# Patient Record
Sex: MALE | Race: WHITE | Employment: OTHER | URBAN - NONMETROPOLITAN AREA
[De-identification: names, ages, dates, MRNs, and addresses within clinical notes are randomized per-mention and may not be internally consistent; named-entity substitution may affect disease eponyms.]

---

## 2017-07-05 LAB
ANION GAP SERPL CALCULATED.3IONS-SCNC: 7 MMOL/L (ref 4–12)
BUN BLDV-MCNC: 27 MG/DL (ref 7–20)
CALCIUM SERPL-MCNC: 11.4 MG/DL (ref 8.8–10.5)
CHLORIDE BLD-SCNC: 104 MEQ/L (ref 101–111)
CO2: 30 MEQ/L (ref 21–32)
CREAT SERPL-MCNC: 1.42 MG/DL (ref 0.7–1.3)
CREATININE CLEARANCE: 49
CREATININE, RANDOM URINE: 73.8 MG/DL
GLUCOSE: 111 MG/DL (ref 70–110)
HCT VFR BLD CALC: 41 % (ref 40–49)
HEMOGLOBIN: 13.2 GM/DL (ref 13.5–16.5)
PARATHYROID HORMONE INTACT: 16.2 U/ML (ref 12–88)
PHOSPHORUS: 3 MG/DL (ref 2.4–4.7)
POTASSIUM SERPL-SCNC: 5.3 MEQ/L (ref 3.6–5)
PROTEIN, URINE, RANDOM: 6 MG/DL
PROTEIN/CREAT RATIO: 0.1 G/1.73M2
SODIUM BLD-SCNC: 141 MEQ/L (ref 135–145)
VITAMIN D 25-HYDROXY: 49.46 NG/ML (ref 30–100)

## 2017-07-10 ENCOUNTER — TELEPHONE (OUTPATIENT)
Dept: NEPHROLOGY | Age: 77
End: 2017-07-10

## 2017-08-01 ENCOUNTER — HOSPITAL ENCOUNTER (OUTPATIENT)
Age: 77
Discharge: HOME | End: 2017-08-01
Payer: MEDICARE

## 2017-08-01 ENCOUNTER — OFFICE VISIT (OUTPATIENT)
Dept: NEPHROLOGY | Age: 77
End: 2017-08-01
Payer: MEDICARE

## 2017-08-01 ENCOUNTER — TELEPHONE (OUTPATIENT)
Dept: NEPHROLOGY | Age: 77
End: 2017-08-01

## 2017-08-01 VITALS
BODY MASS INDEX: 26.45 KG/M2 | RESPIRATION RATE: 16 BRPM | WEIGHT: 195 LBS | DIASTOLIC BLOOD PRESSURE: 60 MMHG | HEART RATE: 48 BPM | SYSTOLIC BLOOD PRESSURE: 130 MMHG

## 2017-08-01 DIAGNOSIS — N18.30 CKD (CHRONIC KIDNEY DISEASE) STAGE 3, GFR 30-59 ML/MIN (HCC): Primary | ICD-10-CM

## 2017-08-01 DIAGNOSIS — E87.5 HYPERKALEMIA: Primary | ICD-10-CM

## 2017-08-01 DIAGNOSIS — E55.9 VITAMIN D DEFICIENCY: ICD-10-CM

## 2017-08-01 DIAGNOSIS — E87.5 HYPERKALEMIA: ICD-10-CM

## 2017-08-01 DIAGNOSIS — I10 ESSENTIAL HYPERTENSION: ICD-10-CM

## 2017-08-01 LAB — POTASSIUM SERPL-SCNC: 5.7 MEQ/L (ref 3.5–5.2)

## 2017-08-01 PROCEDURE — 4040F PNEUMOC VAC/ADMIN/RCVD: CPT | Performed by: INTERNAL MEDICINE

## 2017-08-01 PROCEDURE — 3017F COLORECTAL CA SCREEN DOC REV: CPT | Performed by: INTERNAL MEDICINE

## 2017-08-01 PROCEDURE — G8419 CALC BMI OUT NRM PARAM NOF/U: HCPCS | Performed by: INTERNAL MEDICINE

## 2017-08-01 PROCEDURE — 1123F ACP DISCUSS/DSCN MKR DOCD: CPT | Performed by: INTERNAL MEDICINE

## 2017-08-01 PROCEDURE — 99214 OFFICE O/P EST MOD 30 MIN: CPT | Performed by: INTERNAL MEDICINE

## 2017-08-01 PROCEDURE — G8427 DOCREV CUR MEDS BY ELIG CLIN: HCPCS | Performed by: INTERNAL MEDICINE

## 2017-08-01 PROCEDURE — 1036F TOBACCO NON-USER: CPT | Performed by: INTERNAL MEDICINE

## 2017-08-01 PROCEDURE — 36415 COLL VENOUS BLD VENIPUNCTURE: CPT

## 2017-08-01 PROCEDURE — 84132 ASSAY OF SERUM POTASSIUM: CPT

## 2017-08-01 RX ORDER — SODIUM POLYSTYRENE SULFONATE 15 G/60ML
15 SUSPENSION ORAL; RECTAL ONCE
Qty: 60 ML | Refills: 0
Start: 2017-08-01 | End: 2019-07-30 | Stop reason: ALTCHOICE

## 2017-08-01 RX ORDER — M-VIT,TX,IRON,MINS/CALC/FOLIC 27MG-0.4MG
1 TABLET ORAL DAILY
COMMUNITY
End: 2019-07-30 | Stop reason: ALTCHOICE

## 2017-08-07 ENCOUNTER — TELEPHONE (OUTPATIENT)
Dept: NEPHROLOGY | Age: 77
End: 2017-08-07

## 2017-08-07 DIAGNOSIS — R39.15 URINARY URGENCY: Primary | ICD-10-CM

## 2017-08-07 LAB
DIGOXIN LEVEL: 0.7 NG/ML (ref 0.5–2)
POTASSIUM SERPL-SCNC: 3.7 MEQ/L (ref 3.6–5)

## 2017-08-08 LAB
APPEARANCE: ABNORMAL
BACTERIA: ABNORMAL
BILIRUBIN URINE: NEGATIVE
COLOR: YELLOW
GLUCOSE URINE: NEGATIVE
KETONES, URINE: NEGATIVE
LEUKOCYTES, UA: ABNORMAL
NITRITE, URINE: NEGATIVE
PH, URINE: 6 (ref 5–8)
PROTEIN, URINE: NEGATIVE
RBC URINE: ABNORMAL /HPF
SPECIFIC GRAVITY, URINE: 1.01 (ref 1–1.03)
SPERM: PRESENT
SQUAMOUS EPITHELIAL: ABNORMAL /HPF
URINALYSIS REFLEX: YES
URINE HGB: NEGATIVE
UROBILINOGEN, URINE: ABNORMAL (ref 0.2–1)
WBC CLUMPS: PRESENT /HPF
WBC URINE: >100 /HPF

## 2017-08-09 RX ORDER — CIPROFLOXACIN 250 MG/1
250 TABLET, FILM COATED ORAL 2 TIMES DAILY
Qty: 14 TABLET | Refills: 0 | Status: SHIPPED | OUTPATIENT
Start: 2017-08-09 | End: 2017-08-16

## 2017-08-10 LAB — URINE CULTURE REFLEX: NORMAL

## 2017-08-14 ENCOUNTER — TELEPHONE (OUTPATIENT)
Dept: NEPHROLOGY | Age: 77
End: 2017-08-14

## 2017-08-14 DIAGNOSIS — R52 PAIN: Primary | ICD-10-CM

## 2017-08-17 DIAGNOSIS — R52 PAIN: ICD-10-CM

## 2018-07-16 LAB
ANION GAP SERPL CALCULATED.3IONS-SCNC: 9 MMOL/L (ref 4–12)
BUN BLDV-MCNC: 20 MG/DL (ref 7–20)
CALCIUM SERPL-MCNC: 11.1 MG/DL (ref 8.8–10.5)
CHLORIDE BLD-SCNC: 101 MEQ/L (ref 101–111)
CO2: 29 MEQ/L (ref 21–32)
CREAT SERPL-MCNC: 1.33 MG/DL (ref 0.7–1.3)
CREATININE CLEARANCE: 52
GLUCOSE: 84 MG/DL (ref 70–110)
PARATHYROID HORMONE INTACT: 19.4 U/ML (ref 12–88)
PHOSPHORUS: 2.3 MG/DL (ref 2.4–4.7)
POTASSIUM SERPL-SCNC: 4.1 MEQ/L (ref 3.6–5)
SODIUM BLD-SCNC: 139 MEQ/L (ref 135–145)
VITAMIN D 25-HYDROXY: 57.49 NG/ML (ref 30–100)

## 2018-07-26 LAB
A/G RATIO: 0.8
ALBUMIN SERPL-MCNC: 3.4 G/DL
ALP BLD-CCNC: 42 U/L
ALT SERPL-CCNC: 23 U/L
AST SERPL-CCNC: 20 U/L
BASOPHILS ABSOLUTE: ABNORMAL /ΜL
BASOPHILS RELATIVE PERCENT: ABNORMAL %
BILIRUB SERPL-MCNC: 0.5 MG/DL (ref 0.1–1.4)
BILIRUBIN DIRECT: NORMAL MG/DL
BILIRUBIN, INDIRECT: NORMAL
BUN BLDV-MCNC: 22 MG/DL
CALCIUM SERPL-MCNC: 9.8 MG/DL
CHLORIDE BLD-SCNC: 101 MMOL/L
CHOLESTEROL, TOTAL: 105 MG/DL
CHOLESTEROL/HDL RATIO: NORMAL
CO2: 30 MMOL/L
CREAT SERPL-MCNC: 1.5 MG/DL
EOSINOPHILS ABSOLUTE: ABNORMAL /ΜL
EOSINOPHILS RELATIVE PERCENT: ABNORMAL %
GFR CALCULATED: 45.5
GLOBULIN: NORMAL
GLUCOSE BLD-MCNC: 92 MG/DL
HCT VFR BLD CALC: 40.6 % (ref 41–53)
HDLC SERPL-MCNC: 35 MG/DL (ref 35–70)
HEMOGLOBIN: 13 G/DL (ref 13.5–17.5)
LDL CHOLESTEROL CALCULATED: 50 MG/DL (ref 0–160)
LYMPHOCYTES ABSOLUTE: ABNORMAL /ΜL
LYMPHOCYTES RELATIVE PERCENT: ABNORMAL %
MCH RBC QN AUTO: ABNORMAL PG
MCHC RBC AUTO-ENTMCNC: ABNORMAL G/DL
MCV RBC AUTO: ABNORMAL FL
MONOCYTES ABSOLUTE: ABNORMAL /ΜL
MONOCYTES RELATIVE PERCENT: ABNORMAL %
NEUTROPHILS ABSOLUTE: ABNORMAL /ΜL
NEUTROPHILS RELATIVE PERCENT: ABNORMAL %
PLATELET # BLD: 212 K/ΜL
PMV BLD AUTO: ABNORMAL FL
POTASSIUM SERPL-SCNC: 4.6 MMOL/L
PROTEIN TOTAL: 7.6 G/DL
RBC # BLD: 4.5 10^6/ΜL
SODIUM BLD-SCNC: 138 MMOL/L
TRIGL SERPL-MCNC: 102 MG/DL
URIC ACID: 6.6
VLDLC SERPL CALC-MCNC: NORMAL MG/DL
WBC # BLD: 8.5 10^3/ML

## 2018-07-31 ENCOUNTER — OFFICE VISIT (OUTPATIENT)
Dept: NEPHROLOGY | Age: 78
End: 2018-07-31
Payer: MEDICARE

## 2018-07-31 VITALS — WEIGHT: 190 LBS | DIASTOLIC BLOOD PRESSURE: 80 MMHG | SYSTOLIC BLOOD PRESSURE: 128 MMHG | BODY MASS INDEX: 25.77 KG/M2

## 2018-07-31 DIAGNOSIS — N18.30 CKD (CHRONIC KIDNEY DISEASE) STAGE 3, GFR 30-59 ML/MIN (HCC): Primary | ICD-10-CM

## 2018-07-31 DIAGNOSIS — I10 ESSENTIAL HYPERTENSION: ICD-10-CM

## 2018-07-31 PROCEDURE — 1123F ACP DISCUSS/DSCN MKR DOCD: CPT | Performed by: INTERNAL MEDICINE

## 2018-07-31 PROCEDURE — 99213 OFFICE O/P EST LOW 20 MIN: CPT | Performed by: INTERNAL MEDICINE

## 2018-07-31 PROCEDURE — 1101F PT FALLS ASSESS-DOCD LE1/YR: CPT | Performed by: INTERNAL MEDICINE

## 2018-07-31 PROCEDURE — 4040F PNEUMOC VAC/ADMIN/RCVD: CPT | Performed by: INTERNAL MEDICINE

## 2018-07-31 PROCEDURE — G8427 DOCREV CUR MEDS BY ELIG CLIN: HCPCS | Performed by: INTERNAL MEDICINE

## 2018-07-31 PROCEDURE — 1036F TOBACCO NON-USER: CPT | Performed by: INTERNAL MEDICINE

## 2018-07-31 PROCEDURE — G8419 CALC BMI OUT NRM PARAM NOF/U: HCPCS | Performed by: INTERNAL MEDICINE

## 2018-07-31 NOTE — PROGRESS NOTES
the lungs as needed.  allopurinol (ZYLOPRIM) 300 MG tablet Take 100 mg by mouth daily.  atorvastatin (LIPITOR) 40 MG tablet Take 40 mg by mouth daily. 1/2      fenofibrate (TRICOR) 145 MG tablet Take 145 mg by mouth daily.  lisinopril (PRINIVIL;ZESTRIL) 2.5 MG tablet Take 2.5 mg by mouth daily.  sodium polystyrene (KAYEXALATE) 15 GM/60ML suspension Take 60 mLs by mouth once for 1 dose Daily x2 doses 60 mL 0     No current facility-administered medications for this visit.         Lab Results:    CBC:   Lab Results   Component Value Date    WBC 8.5 07/26/2018    HGB 13.0 (A) 07/26/2018    HCT 40.6 (A) 07/26/2018     07/26/2018     BMP:    Lab Results   Component Value Date     07/26/2018     07/16/2018     07/05/2017    K 4.6 07/26/2018    K 4.1 07/16/2018    K 3.7 08/07/2017     07/26/2018     07/16/2018     07/05/2017    CO2 30 07/26/2018    CO2 29 07/16/2018    CO2 30 07/05/2017    BUN 22 07/26/2018    BUN 20 07/16/2018    BUN 27 (H) 07/05/2017    CREATININE 1.5 07/26/2018    CREATININE 1.33 (H) 07/16/2018    CREATININE 1.42 (H) 07/05/2017    GLUCOSE 92 07/26/2018    GLUCOSE 84 07/16/2018    GLUCOSE 111 (H) 07/05/2017      Hepatic:   Lab Results   Component Value Date    AST 20 07/26/2018    ALT 23 07/26/2018    BILITOT 0.5 07/26/2018    ALKPHOS 42 07/26/2018     BNP: No results found for: BNP  Lipids:   Lab Results   Component Value Date    CHOL 105 07/26/2018    HDL 35 07/26/2018     INR: No results found for: INR  URINE:   Lab Results   Component Value Date    PROTUR Negative 08/08/2017     Lab Results   Component Value Date    NITRU Negative 08/08/2017    COLORU Yellow 08/08/2017    WBCUA >100 08/08/2017    RBCUA 21-50 08/08/2017    MUCUS Present 12/03/2014    YEAST Present 06/30/2015    BACTERIA Trace 08/08/2017    LEUKOCYTESUR Large 08/08/2017    UROBILINOGEN <2.0 E.U./dL 08/08/2017    BILIRUBINUR Negative 08/08/2017    GLUCOSEU Negative 08/08/2017    KETUA Negative 08/08/2017    AMORPHOUS Present 12/03/2014      Microalbumen/Creatinine ratio:  No components found for: RUCREAT    Objective:   Vitals: /80   Wt 190 lb (86.2 kg)   BMI 25.77 kg/m²      Constitutional:  Alert, awake, no apparent distress  Skin:normal  HEENT:Pupils are reactive . Throat is clear  Neck:supple with no thyromegally  Cardiovascular:  S1, S2 without murmur  Respiratory:  Clear with no wheezes or rales   Abdomen: +bs, soft, non tender  Ext: No LE edema  Musculoskeletal:Intact  Neuro:Alert and oriented with no deficit    Electronically signed by Sirena Michaels MD on 7/31/2018 at 10:53 AM

## 2018-08-02 ENCOUNTER — TELEPHONE (OUTPATIENT)
Dept: NEPHROLOGY | Age: 78
End: 2018-08-02

## 2019-01-23 ENCOUNTER — TELEPHONE (OUTPATIENT)
Dept: NEPHROLOGY | Age: 79
End: 2019-01-23

## 2019-01-24 ENCOUNTER — TELEPHONE (OUTPATIENT)
Dept: NEPHROLOGY | Age: 79
End: 2019-01-24

## 2019-06-26 LAB
ALBUMIN SERPL-MCNC: 3.3 G/DL
ALP BLD-CCNC: 48 U/L
ALT SERPL-CCNC: 23 U/L
ANION GAP SERPL CALCULATED.3IONS-SCNC: 3 MMOL/L
AST SERPL-CCNC: 24 U/L
BASOPHILS ABSOLUTE: ABNORMAL /ΜL
BASOPHILS RELATIVE PERCENT: ABNORMAL %
BILIRUB SERPL-MCNC: 0.6 MG/DL (ref 0.1–1.4)
BUN BLDV-MCNC: 18 MG/DL
CALCIUM SERPL-MCNC: 10.7 MG/DL
CHLORIDE BLD-SCNC: 104 MMOL/L
CO2: 32 MMOL/L
CREAT SERPL-MCNC: 1.5 MG/DL
EOSINOPHILS ABSOLUTE: ABNORMAL /ΜL
EOSINOPHILS RELATIVE PERCENT: ABNORMAL %
GFR CALCULATED: 45.4
GLUCOSE BLD-MCNC: 84 MG/DL
HCT VFR BLD CALC: 37.1 % (ref 41–53)
HEMOGLOBIN: 12.5 G/DL (ref 13.5–17.5)
IRON SATURATION: 28
IRON: 144
LYMPHOCYTES ABSOLUTE: ABNORMAL /ΜL
LYMPHOCYTES RELATIVE PERCENT: ABNORMAL %
MCH RBC QN AUTO: ABNORMAL PG
MCHC RBC AUTO-ENTMCNC: ABNORMAL G/DL
MCV RBC AUTO: ABNORMAL FL
MONOCYTES ABSOLUTE: ABNORMAL /ΜL
MONOCYTES RELATIVE PERCENT: ABNORMAL %
NEUTROPHILS ABSOLUTE: ABNORMAL /ΜL
NEUTROPHILS RELATIVE PERCENT: ABNORMAL %
PLATELET # BLD: 189 K/ΜL
PMV BLD AUTO: ABNORMAL FL
POTASSIUM SERPL-SCNC: 5.1 MMOL/L
RBC # BLD: 4.15 10^6/ΜL
SODIUM BLD-SCNC: 139 MMOL/L
TOTAL IRON BINDING CAPACITY: 509
TOTAL PROTEIN: 7.1
URIC ACID: 6.4
WBC # BLD: 6.5 10^3/ML

## 2019-07-22 LAB
ANION GAP SERPL CALCULATED.3IONS-SCNC: 5 MMOL/L (ref 4–12)
BUN BLDV-MCNC: 16 MG/DL (ref 7–20)
CALCIUM SERPL-MCNC: 10.7 MG/DL (ref 8.8–10.5)
CHLORIDE BLD-SCNC: 103 MEQ/L (ref 101–111)
CO2: 33 MEQ/L (ref 21–32)
CREAT SERPL-MCNC: 1.27 MG/DL (ref 0.6–1.3)
CREATININE CLEARANCE: 55
GLUCOSE: 95 MG/DL (ref 70–110)
PARATHYROID HORMONE INTACT: 24.5 U/ML (ref 12–88)
PHOSPHORUS: 2.3 MG/DL (ref 2.4–4.7)
POTASSIUM SERPL-SCNC: 5.3 MEQ/L (ref 3.6–5)
SODIUM BLD-SCNC: 141 MEQ/L (ref 135–145)
VITAMIN D 25-HYDROXY: 41.2 NG/ML (ref 30–100)

## 2019-07-23 ENCOUNTER — TELEPHONE (OUTPATIENT)
Dept: NEPHROLOGY | Age: 79
End: 2019-07-23

## 2019-07-23 DIAGNOSIS — E83.52 HYPERCALCEMIA: Primary | ICD-10-CM

## 2019-07-23 DIAGNOSIS — E87.5 HYPERKALEMIA: ICD-10-CM

## 2019-07-23 NOTE — TELEPHONE ENCOUNTER
Pt called back and states the pharmacy does not have any Veltassa. It is $150. Pt will stop by the office for samples.

## 2019-07-27 LAB
ANION GAP SERPL CALCULATED.3IONS-SCNC: 4 MMOL/L (ref 4–12)
BUN BLDV-MCNC: 18 MG/DL (ref 7–20)
CALCIUM SERPL-MCNC: 10.6 MG/DL (ref 8.8–10.5)
CHLORIDE BLD-SCNC: 106 MEQ/L (ref 101–111)
CO2: 33 MEQ/L (ref 21–32)
CREAT SERPL-MCNC: 1.67 MG/DL (ref 0.6–1.3)
CREATININE CLEARANCE: 40
GLUCOSE: 96 MG/DL (ref 70–110)
POTASSIUM SERPL-SCNC: 5.5 MEQ/L (ref 3.6–5)
SODIUM BLD-SCNC: 143 MEQ/L (ref 135–145)

## 2019-07-30 ENCOUNTER — OFFICE VISIT (OUTPATIENT)
Dept: NEPHROLOGY | Age: 79
End: 2019-07-30
Payer: MEDICARE

## 2019-07-30 ENCOUNTER — TELEPHONE (OUTPATIENT)
Dept: NEPHROLOGY | Age: 79
End: 2019-07-30

## 2019-07-30 VITALS
WEIGHT: 188.2 LBS | DIASTOLIC BLOOD PRESSURE: 75 MMHG | BODY MASS INDEX: 25.52 KG/M2 | HEART RATE: 60 BPM | SYSTOLIC BLOOD PRESSURE: 142 MMHG | OXYGEN SATURATION: 96 %

## 2019-07-30 DIAGNOSIS — N17.9 AKI (ACUTE KIDNEY INJURY) (HCC): Primary | ICD-10-CM

## 2019-07-30 DIAGNOSIS — E87.5 HYPERKALEMIA: ICD-10-CM

## 2019-07-30 DIAGNOSIS — E83.52 HYPERCALCEMIA: ICD-10-CM

## 2019-07-30 DIAGNOSIS — I10 ESSENTIAL HYPERTENSION: ICD-10-CM

## 2019-07-30 DIAGNOSIS — N18.30 CKD (CHRONIC KIDNEY DISEASE) STAGE 3, GFR 30-59 ML/MIN (HCC): ICD-10-CM

## 2019-07-30 PROCEDURE — 1123F ACP DISCUSS/DSCN MKR DOCD: CPT | Performed by: INTERNAL MEDICINE

## 2019-07-30 PROCEDURE — G8419 CALC BMI OUT NRM PARAM NOF/U: HCPCS | Performed by: INTERNAL MEDICINE

## 2019-07-30 PROCEDURE — G8427 DOCREV CUR MEDS BY ELIG CLIN: HCPCS | Performed by: INTERNAL MEDICINE

## 2019-07-30 PROCEDURE — 1036F TOBACCO NON-USER: CPT | Performed by: INTERNAL MEDICINE

## 2019-07-30 PROCEDURE — 4040F PNEUMOC VAC/ADMIN/RCVD: CPT | Performed by: INTERNAL MEDICINE

## 2019-07-30 PROCEDURE — 99213 OFFICE O/P EST LOW 20 MIN: CPT | Performed by: INTERNAL MEDICINE

## 2019-07-30 RX ORDER — OMEPRAZOLE 10 MG/1
20 CAPSULE, DELAYED RELEASE ORAL DAILY
COMMUNITY
End: 2021-11-11

## 2019-07-30 RX ORDER — DOCUSATE SODIUM 100 MG/1
100 CAPSULE, LIQUID FILLED ORAL PRN
COMMUNITY
End: 2021-11-11

## 2019-07-30 RX ORDER — BUDESONIDE 0.5 MG/2ML
1 INHALANT ORAL 2 TIMES DAILY
COMMUNITY

## 2019-08-06 LAB — POTASSIUM SERPL-SCNC: 4.3 MEQ/L (ref 3.6–5)

## 2019-08-07 ENCOUNTER — TELEPHONE (OUTPATIENT)
Dept: NEPHROLOGY | Age: 79
End: 2019-08-07

## 2019-08-07 NOTE — TELEPHONE ENCOUNTER
----- Message from Meaghan Walsh MD sent at 8/6/2019  4:42 PM EDT -----  Serum potassium is back to normal.

## 2020-07-13 ENCOUNTER — TELEPHONE (OUTPATIENT)
Dept: NEPHROLOGY | Age: 80
End: 2020-07-13

## 2020-07-13 NOTE — TELEPHONE ENCOUNTER
Tera Prado from Dr. Lo Romeo office called. She said that she needs hydration orders for CTA of chest please. When patient comes in to be seen 7/28/20.

## 2020-07-29 NOTE — TELEPHONE ENCOUNTER
Patient need to drink 500 mL fluid which is 16 ounces before the procedure and another 500 mL immediately after the procedure  BMP in 3 to 4 days after the procedure

## 2020-08-03 NOTE — TELEPHONE ENCOUNTER
I faxed this information to Dr. Sepulveda [facility] office.

## 2020-09-19 LAB
ANION GAP SERPL CALCULATED.3IONS-SCNC: 4 MMOL/L (ref 4–12)
BUN BLDV-MCNC: 22 MG/DL (ref 7–20)
CALCIUM SERPL-MCNC: 10.4 MG/DL (ref 8.8–10.5)
CHLORIDE BLD-SCNC: 102 MEQ/L (ref 101–111)
CO2: 32 MEQ/L (ref 21–32)
CREAT SERPL-MCNC: 1.25 MG/DL (ref 0.6–1.3)
CREATININE CLEARANCE: 56
GLUCOSE: 81 MG/DL (ref 70–110)
POTASSIUM SERPL-SCNC: 4.4 MEQ/L (ref 3.6–5)
SODIUM BLD-SCNC: 138 MEQ/L (ref 135–145)

## 2020-11-10 ENCOUNTER — HOSPITAL ENCOUNTER (OUTPATIENT)
Age: 80
Discharge: HOME | End: 2020-11-10
Payer: MEDICARE

## 2020-11-10 DIAGNOSIS — E55.9 VITAMIN D DEFICIENCY: ICD-10-CM

## 2020-11-10 DIAGNOSIS — N18.32 STAGE 3B CHRONIC KIDNEY DISEASE (HCC): ICD-10-CM

## 2020-11-10 LAB
ANION GAP SERPL CALCULATED.3IONS-SCNC: 11 MEQ/L (ref 8–16)
BUN BLDV-MCNC: 25 MG/DL (ref 7–22)
CALCIUM SERPL-MCNC: 10.7 MG/DL (ref 8.5–10.5)
CHLORIDE BLD-SCNC: 103 MEQ/L (ref 98–111)
CO2: 32 MEQ/L (ref 23–33)
CREAT SERPL-MCNC: 1.5 MG/DL (ref 0.4–1.2)
GFR SERPL CREATININE-BSD FRML MDRD: 45 ML/MIN/1.73M2
GLUCOSE BLD-MCNC: 99 MG/DL (ref 70–108)
POTASSIUM SERPL-SCNC: 4.7 MEQ/L (ref 3.5–5.2)
PTH INTACT: 37.6 PG/ML (ref 15–65)
SODIUM BLD-SCNC: 146 MEQ/L (ref 135–145)
VITAMIN D 25-HYDROXY: 48 NG/ML (ref 30–100)

## 2020-11-10 PROCEDURE — 36415 COLL VENOUS BLD VENIPUNCTURE: CPT

## 2020-11-10 PROCEDURE — 80048 BASIC METABOLIC PNL TOTAL CA: CPT

## 2020-11-10 PROCEDURE — 83970 ASSAY OF PARATHORMONE: CPT

## 2020-11-10 PROCEDURE — 82306 VITAMIN D 25 HYDROXY: CPT

## 2020-11-12 ENCOUNTER — VIRTUAL VISIT (OUTPATIENT)
Dept: NEPHROLOGY | Age: 80
End: 2020-11-12
Payer: MEDICARE

## 2020-11-12 PROCEDURE — 99213 OFFICE O/P EST LOW 20 MIN: CPT | Performed by: INTERNAL MEDICINE

## 2020-11-12 NOTE — PROGRESS NOTES
2020    TELEHEALTH EVALUATION -- Audio/Visual (During [id number]-[id number] public health emergency)  Telehealth video visit for Mr. De Kuo. Place of visit for the patient is home  Place of visit for physician is office  Reason for visit is follow-up for chronic kidney disease  Patient initiated the visit    HPI: This is a follow-up visit for Mr. De Kuo. I see him for chronic kidney disease. He was last seen in 2019. Doing well since then. No new medication. No complaint. No lower extremity edema. No chest pain. He has  chronic shortness of breath and wears oxygen around-the-clock. No fever or chills. No nausea or vomiting. [employer] (:  1940) has requested an audio/video evaluation for the following concern(s):    Follow-up for chronic kidney disease    Review of Systems: Review of system is essentially negative except for chronic shortness of breath requiring home oxygen. No chest pain. No difficulties with urination. No nausea vomiting. No fever or chills. No headaches. No abdominal pain. Prior to Visit Medications    Medication Sig Taking? Authorizing Provider   budesonide (PULMICORT) 0.5 MG/2ML nebulizer suspension Take 1 ampule by nebulization 2 times daily Yes Historical Provider, MD   Arformoterol Tartrate (BROVANA IN) Inhale into the lungs Yes Historical Provider, MD   omeprazole (PRILOSEC) 10 MG delayed release capsule Take 20 mg by mouth daily Yes Historical Provider, MD   tiotropium (SPIRIVA HANDIHALER) 18 MCG inhalation capsule Inhale 18 mcg into the lungs daily Yes Historical Provider, MD   docusate sodium (COLACE) 100 MG capsule Take 100 mg by mouth 2 times daily Yes Historical Provider, MD   MILK THISTLE PO Take 1,000 mg by mouth daily  Yes Historical Provider, MD   aspirin 81 MG tablet Take 81 mg by mouth daily Yes Historical Provider, MD   OXYGEN Inhale  into the lungs as needed.  Yes Historical Provider, MD   allopurinol (ZYLOPRIM) 300 MG tablet Take 100 mg by mouth daily. Yes Historical Provider, MD   atorvastatin (LIPITOR) 40 MG tablet Take 20 mg by mouth daily  Yes Historical Provider, MD   fenofibrate (TRICOR) 145 MG tablet Take 145 mg by mouth daily. Yes Historical Provider, MD   lisinopril (PRINIVIL;ZESTRIL) 2.5 MG tablet Take 2.5 mg by mouth daily. Yes Historical Provider, MD       Social History     Tobacco Use    Smoking status: Former Smoker     Packs/day: 2.00     Years: 40.00     Pack years: 80.00     Types: Cigarettes     Last attempt to quit: 1989     Years since quittin.6    Smokeless tobacco: Never Used   Substance Use Topics    Alcohol use: Not on file    Drug use: Not on file            PHYSICAL EXAMINATION:  [ INSTRUCTIONS:  \"[x]\" Indicates a positive item  \"[]\" Indicates a negative item  -- DELETE ALL ITEMS NOT EXAMINED]  Vital Signs: (As obtained by patient/caregiver or practitioner observation)    Blood pressure-128/80 heart rate-    Respiratory rate-    Temperature-  Pulse oximetry-     Constitutional: [x] Appears well-developed and well-nourished [x] No apparent distress      [] Abnormal-   Mental status  [x] Alert and awake  [x] Oriented to person/place/time [x]Able to follow commands      Eyes:  EOM    [x]  Normal  [] Abnormal-  Sclera  [x]  Normal  [] Abnormal -         Discharge [x]  None visible  [] Abnormal -    HENT:   [x] Normocephalic, atraumatic.   [] Abnormal   [x] Mouth/Throat: Mucous membranes are moist.     External Ears [x] Normal  [] Abnormal-     Neck: [x] No visualized mass     Pulmonary/Chest: [x] Respiratory effort normal.  [x] No visualized signs of difficulty breathing or respiratory distress        [] Abnormal-      Musculoskeletal:   [] Normal gait with no signs of ataxia         [] Normal range of motion of neck        [] Abnormal-       Neurological:        [x] No Facial Asymmetry (Cranial nerve 7 motor function) (limited exam to video visit)          [x] No gaze palsy        [] Abnormal- Skin:        [x] No significant exanthematous lesions or discoloration noted on facial skin         [] Abnormal-            Psychiatric:       [x] Normal Affect [x] No Hallucinations        [] Abnormal-     Other pertinent observable physical exam findings-     ASSESSMENT/PLAN:   Diagnosis Orders   1. Stage 3b chronic kidney disease  Basic Metabolic Panel   2. Vitamin D deficiency  Vitamin D 25 Hydroxy    PTH, Intact   3. Essential hypertension     PLAN:  Lab results reviewed with the patient. He understood. I addressed his questions. Serum creatinine is slightly improved to 1.5 mg/dL from 1.67 mg/dL in July 2019 the last time I saw him. However, it is increased from 1.25 mg/dl in September 2020. Medications reviewed  No changes  I encouraged him to drink more fluid  Return visit in 12 months with labs      Return in about 1 year (around 11/12/2021). Scott Rogers is a 79 y.o. male being evaluated by a Virtual Visit (video visit) encounter to address concerns as mentioned above. A caregiver was present when appropriate. Due to this being a TeleHealth encounter (During [address] emergency), evaluation of the following organ systems was limited: Vitals/Constitutional/EENT/Resp/CV/GI//MS/Neuro/Skin/Heme-Lymph-Imm. Pursuant to the emergency declaration under the [address] [place] [address], [address] [place] [address] authority and the [employer] Act, this Virtual Visit was conducted with patient's (and/or legal guardian's) consent, to reduce the patient's risk of exposure to COVID-19 and provide necessary medical care. The patient (and/or legal guardian) has also been advised to contact this office for worsening conditions or problems, and seek emergency medical treatment and/or call 911 if deemed necessary.      Patient identification was verified at the start of the visit: Yes    Total time spent on this encounter: About 15 minutes including  documentation time and chart review    Services were provided through a video synchronous discussion virtually to substitute for in-person clinic visit. Patient and provider were located at their individual homes. --Kathy Smith MD on 11/12/2020 at 4:56 PM    An electronic signature was used to authenticate this note.

## 2021-01-12 ENCOUNTER — HOSPITAL ENCOUNTER (OUTPATIENT)
Age: 81
Setting detail: SPECIMEN
Discharge: HOME | End: 2021-01-12
Payer: MEDICARE

## 2021-01-12 LAB
ANION GAP SERPL CALCULATED.3IONS-SCNC: 7 MEQ/L (ref 8–16)
BUN BLDV-MCNC: 25 MG/DL (ref 7–22)
CALCIUM SERPL-MCNC: 11.4 MG/DL (ref 8.5–10.5)
CHLORIDE BLD-SCNC: 100 MEQ/L (ref 98–111)
CO2: 29 MEQ/L (ref 23–33)
CREAT SERPL-MCNC: 1.1 MG/DL (ref 0.4–1.2)
GFR SERPL CREATININE-BSD FRML MDRD: 65 ML/MIN/1.73M2
GLUCOSE BLD-MCNC: 85 MG/DL (ref 70–108)
MAGNESIUM: 1.7 MG/DL (ref 1.6–2.4)
POTASSIUM SERPL-SCNC: 5.2 MEQ/L (ref 3.5–5.2)
SODIUM BLD-SCNC: 136 MEQ/L (ref 135–145)

## 2021-01-12 PROCEDURE — 36415 COLL VENOUS BLD VENIPUNCTURE: CPT

## 2021-01-12 PROCEDURE — 83735 ASSAY OF MAGNESIUM: CPT

## 2021-01-12 PROCEDURE — 80048 BASIC METABOLIC PNL TOTAL CA: CPT

## 2021-01-19 ENCOUNTER — TELEPHONE (OUTPATIENT)
Dept: NEPHROLOGY | Age: 81
End: 2021-01-19

## 2021-01-19 DIAGNOSIS — N18.32 STAGE 3B CHRONIC KIDNEY DISEASE (HCC): Primary | ICD-10-CM

## 2021-01-19 NOTE — TELEPHONE ENCOUNTER
Dr. Lavinia Rashid called in regards to pt's calcium level. He wants to know if pt is taking any calcium supplements or consuming a lot of foods rich in calcium. If so, he needs to stop and repeat BMP on 1/21/21.

## 2021-01-21 ENCOUNTER — TELEPHONE (OUTPATIENT)
Dept: NEPHROLOGY | Age: 81
End: 2021-01-21

## 2021-01-21 DIAGNOSIS — E83.52 HYPERCALCEMIA: Primary | ICD-10-CM

## 2021-01-21 LAB
ANION GAP SERPL CALCULATED.3IONS-SCNC: 4 MMOL/L (ref 4–12)
BUN BLDV-MCNC: 22 MG/DL (ref 7–20)
CALCIUM SERPL-MCNC: 10.9 MG/DL (ref 8.8–10.5)
CHLORIDE BLD-SCNC: 100 MEQ/L (ref 101–111)
CO2: 30 MEQ/L (ref 21–32)
CREAT SERPL-MCNC: 1.1 MG/DL (ref 0.6–1.3)
CREATININE CLEARANCE: >60
GLUCOSE: 86 MG/DL (ref 70–110)
POTASSIUM SERPL-SCNC: 4.3 MEQ/L (ref 3.6–5)
SODIUM BLD-SCNC: 134 MEQ/L (ref 135–145)

## 2021-01-21 NOTE — TELEPHONE ENCOUNTER
----- Message from Skippy Duverney, MD sent at 1/21/2021  4:31 PM EST -----  Serum calcium is improved but still a little high. Continue to drink more fluid. No calcium supplement. Cut down on milk or dairy products if taking any. BMP in 6 days.

## 2021-01-22 NOTE — TELEPHONE ENCOUNTER
I called pt and informed him of this. He will continue the fluid consumption and watch his calcium intake. He will go to [facility] next week to have blood work done.

## 2021-01-29 LAB
ANION GAP SERPL CALCULATED.3IONS-SCNC: 3 MMOL/L (ref 4–12)
BUN BLDV-MCNC: 18 MG/DL (ref 7–20)
CALCIUM SERPL-MCNC: 10.3 MG/DL (ref 8.8–10.5)
CHLORIDE BLD-SCNC: 102 MEQ/L (ref 101–111)
CO2: 32 MEQ/L (ref 21–32)
CREAT SERPL-MCNC: 1.12 MG/DL (ref 0.6–1.3)
CREATININE CLEARANCE: >60
GLUCOSE: 84 MG/DL (ref 70–110)
PARATHYROID HORMONE INTACT: 23.8 U/ML (ref 12–88)
POTASSIUM SERPL-SCNC: 5.6 MEQ/L (ref 3.6–5)
SODIUM BLD-SCNC: 137 MEQ/L (ref 135–145)
VITAMIN D 25-HYDROXY: 55.5 NG/ML (ref 30–100)

## 2021-02-01 ENCOUNTER — TELEPHONE (OUTPATIENT)
Dept: NEPHROLOGY | Age: 81
End: 2021-02-01

## 2021-02-01 DIAGNOSIS — E87.5 HYPERKALEMIA: Primary | ICD-10-CM

## 2021-02-01 RX ORDER — SODIUM POLYSTYRENE SULFONATE 15 G/60ML
SUSPENSION ORAL; RECTAL
Qty: 180 ML | Refills: 0 | Status: SHIPPED | OUTPATIENT
Start: 2021-02-01 | End: 2021-11-08

## 2021-02-01 NOTE — TELEPHONE ENCOUNTER
----- Message from Wes Jean MD sent at 2/1/2021  5:38 AM EST -----  Serum potassium is slightly high  Low potassium diet   Kayexalate 15 gm daily for three days   Repeat serum potassium 3 days

## 2021-02-04 ENCOUNTER — TELEPHONE (OUTPATIENT)
Dept: NEPHROLOGY | Age: 81
End: 2021-02-04

## 2021-02-04 LAB
ANION GAP SERPL CALCULATED.3IONS-SCNC: 3 MMOL/L (ref 4–12)
BUN BLDV-MCNC: 17 MG/DL (ref 7–20)
CALCIUM SERPL-MCNC: 9.4 MG/DL (ref 8.8–10.5)
CHLORIDE BLD-SCNC: 102 MEQ/L (ref 101–111)
CO2: 35 MEQ/L (ref 21–32)
CREAT SERPL-MCNC: 0.87 MG/DL (ref 0.6–1.3)
CREATININE CLEARANCE: >60
GLUCOSE: 103 MG/DL (ref 70–110)
POTASSIUM SERPL-SCNC: 3.4 MEQ/L (ref 3.6–5)
SODIUM BLD-SCNC: 140 MEQ/L (ref 135–145)

## 2021-02-04 NOTE — TELEPHONE ENCOUNTER
----- Message from aSnto Thakur MD sent at 2/4/2021  4:07 PM EST -----  Serum potassium is now very slightly low. High potassium diet only for now. May repeat the potassium level in 5 days.

## 2021-02-04 NOTE — TELEPHONE ENCOUNTER
----- Message from Heladio Malone MD sent at 2/4/2021  4:07 PM EST -----  Serum potassium is now very slightly low. High potassium diet only for now. May repeat the potassium level in 5 days.

## 2021-02-04 NOTE — TELEPHONE ENCOUNTER
Patient phoned - states he is having some feet and ankle swelling for 4 days now- has lasix 20 mg at home from a previous time he was taking - asking if he can take some to get swelling down?

## 2021-02-04 NOTE — TELEPHONE ENCOUNTER
Attempted to contact patient. Left ELENI Zuniga,  She verbalized understanding.  Lab order faxed to [facility] for Tuesday 2/05/21

## 2021-02-08 ENCOUNTER — TELEPHONE (OUTPATIENT)
Dept: NEPHROLOGY | Age: 81
End: 2021-02-08

## 2021-02-08 DIAGNOSIS — R23.2 HOT FLASH IN MALE: ICD-10-CM

## 2021-02-08 DIAGNOSIS — N18.2 CHRONIC KIDNEY DISEASE, STAGE II (MILD): Primary | ICD-10-CM

## 2021-02-08 RX ORDER — FUROSEMIDE 20 MG/1
20 TABLET ORAL DAILY
COMMUNITY

## 2021-02-08 NOTE — TELEPHONE ENCOUNTER
I called pt and informed him of this information. He understood. He will go to [facility] tomorrow to get blood work done. Pt also states that he has been having hot flashes which can last from 10 min to a couple of hours. He told his urologist this and his urology told him to contact you. What do you want to do?

## 2021-02-08 NOTE — TELEPHONE ENCOUNTER
Pt states he took the lasix 20 mg daily for 3 days. Pt did not notice any decrease in his swelling. What do you want him to do?

## 2021-02-09 LAB
ANION GAP SERPL CALCULATED.3IONS-SCNC: 2 MMOL/L (ref 4–12)
BUN BLDV-MCNC: 17 MG/DL (ref 7–20)
CALCIUM SERPL-MCNC: 10.2 MG/DL (ref 8.8–10.5)
CHLORIDE BLD-SCNC: 94 MEQ/L (ref 101–111)
CO2: 38 MEQ/L (ref 21–32)
CREAT SERPL-MCNC: 1.01 MG/DL (ref 0.6–1.3)
CREATININE CLEARANCE: >60
GLUCOSE: 96 MG/DL (ref 70–110)
MAGNESIUM: 1.5 MG/DL (ref 1.8–2.5)
POTASSIUM SERPL-SCNC: 4.1 MEQ/L (ref 3.6–5)
SODIUM BLD-SCNC: 134 MEQ/L (ref 135–145)

## 2021-02-10 NOTE — TELEPHONE ENCOUNTER
I called pt and informed him of this. He will contact his PCP about it. I also informed him that his potassium and calcium are in normal range now. He understood.

## 2021-02-17 ENCOUNTER — TELEPHONE (OUTPATIENT)
Dept: NEPHROLOGY | Age: 81
End: 2021-02-17

## 2021-02-17 NOTE — TELEPHONE ENCOUNTER
Yes it can if taken for a long time. However, his most recent serum calcium level is normal.  I will probably cut it down to once a day.

## 2021-02-18 RX ORDER — MULTIVIT-MIN/IRON/FOLIC ACID/K 18-600-40
500 CAPSULE ORAL DAILY
COMMUNITY
End: 2021-11-11

## 2021-03-10 ENCOUNTER — TELEPHONE (OUTPATIENT)
Dept: NEPHROLOGY | Age: 81
End: 2021-03-10

## 2021-03-10 NOTE — TELEPHONE ENCOUNTER
Alec Luke called stating his feet are really swollen again. He said this happened recently and you increased his Lasix for a couple days and it helped a lot but now they are back to where they were before.

## 2021-03-11 NOTE — TELEPHONE ENCOUNTER
I called pt and informed him to take lasix 40 mg daily for a couple of days. He is to watch his salt and fluid intake and to wear elastic stockings. I advised pt to check his weight every day and call the office if he gains 3-5 pounds in a day. He understood.

## 2021-11-04 LAB
ANION GAP SERPL CALCULATED.3IONS-SCNC: 4 MMOL/L (ref 4–12)
BUN BLDV-MCNC: 36 MG/DL (ref 7–20)
CALCIUM SERPL-MCNC: 10.8 MG/DL (ref 8.8–10.5)
CHLORIDE BLD-SCNC: 100 MEQ/L (ref 101–111)
CO2: 33 MEQ/L (ref 21–32)
CREAT SERPL-MCNC: 1.64 MG/DL (ref 0.6–1.3)
CREATININE CLEARANCE: 41
GLUCOSE: 95 MG/DL (ref 70–110)
PARATHYROID HORMONE INTACT: 35.4 U/ML (ref 12–88)
POTASSIUM SERPL-SCNC: 5.4 MEQ/L (ref 3.6–5)
SODIUM BLD-SCNC: 137 MEQ/L (ref 135–145)
VITAMIN D 25-HYDROXY: 53 NG/ML (ref 30–100)

## 2021-11-05 ENCOUNTER — TELEPHONE (OUTPATIENT)
Dept: NEPHROLOGY | Age: 81
End: 2021-11-05

## 2021-11-05 DIAGNOSIS — E87.5 HYPERKALEMIA: Primary | ICD-10-CM

## 2021-11-05 RX ORDER — SODIUM POLYSTYRENE SULFONATE 15 G/60ML
SUSPENSION ORAL; RECTAL
Qty: 240 ML | Refills: 0 | Status: SHIPPED | OUTPATIENT
Start: 2021-11-05 | End: 2021-11-08 | Stop reason: SDUPTHER

## 2021-11-05 NOTE — TELEPHONE ENCOUNTER
----- Message from Alejandrina Moore MD sent at 11/4/2021  4:56 PM EDT -----  Kidney function is a little worse than before. Serum potassium is borderline high. Any new medications?   Kayexalate 15 g a day for 2 days  Potassium level in 4 days

## 2021-11-08 RX ORDER — SODIUM POLYSTYRENE SULFONATE 15 G/60ML
SUSPENSION ORAL; RECTAL
Qty: 240 ML | Refills: 0 | Status: SHIPPED | OUTPATIENT
Start: 2021-11-08 | End: 2022-03-07

## 2021-11-08 NOTE — TELEPHONE ENCOUNTER
I called pt. He states he has not had any new medication changes. He has not been sick either. He will take the kayexalate and repeat potassium later this week. Pt has an appt on 11/11/21 to see you. Do you want to do anything differently in regards to his creatinine?

## 2021-11-08 NOTE — TELEPHONE ENCOUNTER
Pt called and states the medication was not at [facility] and [facility] did not have any. He states to send script to [place]. I informed him to increase his fluid intake.

## 2021-11-11 ENCOUNTER — OFFICE VISIT (OUTPATIENT)
Dept: NEPHROLOGY | Age: 81
End: 2021-11-11
Payer: MEDICARE

## 2021-11-11 VITALS
BODY MASS INDEX: 22.76 KG/M2 | HEART RATE: 68 BPM | WEIGHT: 167.8 LBS | OXYGEN SATURATION: 95 % | SYSTOLIC BLOOD PRESSURE: 116 MMHG | TEMPERATURE: 98.1 F | DIASTOLIC BLOOD PRESSURE: 67 MMHG

## 2021-11-11 DIAGNOSIS — N18.32 STAGE 3B CHRONIC KIDNEY DISEASE (HCC): Primary | ICD-10-CM

## 2021-11-11 DIAGNOSIS — E87.5 HYPERKALEMIA: ICD-10-CM

## 2021-11-11 DIAGNOSIS — E83.52 HYPERCALCEMIA: ICD-10-CM

## 2021-11-11 LAB — POTASSIUM SERPL-SCNC: 4 MEQ/L (ref 3.6–5)

## 2021-11-11 PROCEDURE — 99213 OFFICE O/P EST LOW 20 MIN: CPT | Performed by: INTERNAL MEDICINE

## 2021-11-11 PROCEDURE — G8484 FLU IMMUNIZE NO ADMIN: HCPCS | Performed by: INTERNAL MEDICINE

## 2021-11-11 PROCEDURE — 1123F ACP DISCUSS/DSCN MKR DOCD: CPT | Performed by: INTERNAL MEDICINE

## 2021-11-11 PROCEDURE — 1036F TOBACCO NON-USER: CPT | Performed by: INTERNAL MEDICINE

## 2021-11-11 PROCEDURE — G8427 DOCREV CUR MEDS BY ELIG CLIN: HCPCS | Performed by: INTERNAL MEDICINE

## 2021-11-11 PROCEDURE — 4040F PNEUMOC VAC/ADMIN/RCVD: CPT | Performed by: INTERNAL MEDICINE

## 2021-11-11 PROCEDURE — G8420 CALC BMI NORM PARAMETERS: HCPCS | Performed by: INTERNAL MEDICINE

## 2021-11-11 RX ORDER — SENNOSIDES 8.6 MG
1 TABLET ORAL PRN
COMMUNITY

## 2021-11-11 RX ORDER — AMPICILLIN TRIHYDRATE 250 MG
CAPSULE ORAL
COMMUNITY

## 2021-11-11 RX ORDER — ZINC GLUCONATE 50 MG
50 TABLET ORAL DAILY
COMMUNITY

## 2021-11-11 NOTE — PATIENT INSTRUCTIONS
cooked ½ C 354   Plums, dried, pitted five 350   Artichokes, cooked one medium 343   Mashed potatoes ½ C 343   Edamame/soybeans, green ½ C 338   Tomato, canned  ½ C 325   Raisins ¼ C 299   Tomato, raw one medium 292   Papaya one small 286   Peach one medium 285   Pistachios, dry roasted, salted  1 oz (47 nuts) 285   Pumpkin, cooked, mashed ½ C 282   French fries 10 fries 278   Mushrooms, white, cooked ½ C 278   Beets, cooked ½ C 259   Alpine sprouts, cooked ½ C 247   Kiwi one medium 237   Orange, raw one medium 237   Green peas, cooked ½ C 217   Cantaloupe, raw ½ C 214   Pear, raw one medium 212   Almonds, dry roasted 1 oz (24 nuts) 202   Apricot, canned, juice pack ½ C 202   Asparagus, cooked ½ C 202   [place] squash, cooked ½ C 201   Apple, raw with skin one medium 195   Honeydew ½ C 194   Carrots, cooked ½ C 183   Onions, cooked ½ C 175   Spinach, raw 1 C 167   Corn, sweet yellow ½ C 163   Red yoo pepper ½ C 157   Kale, cooked ½ C 150   Cabbage, cooked ½ C 147   Mustard greens, cooked ½ C 142   Abdullahi ½ C 139   Figs, dried two figs 134   Summer squash, cooked ½ C 126   Grapes 10 grapes 120   Okra, cooked ½ C 108   Bamboo shoots, canned 1 C 108   Celery, raw one stalk 105   Peanut butter, creamy 1 Tbsp 104   Green beans, cooked ½ C 104   Cauliflower, cooked ½ C 91   Mushrooms, shitake, cooked ½ C 88   Watermelon ½ C 85   Cucumber, peeled ½ C 88   Iceberg lettuce 1 C 81   Tomato, sun dried one piece 80   Eggplant, cooked ½ C 69   Pickle one pickle 61   Ketchup 1 Tbsp 60   Radishes one radish 57     5   C=cup, mg=milligrams, oz=ounces, Tbsp=tablespoon    Reference  [place] Dept of Agriculture, Agricultural Research Service, [employer]  [employer] Database for Standard Reference, Release 25: Potassium, K (mg) content of selected foods per common measure

## 2021-11-11 NOTE — PROGRESS NOTES
Renal Progress Note    Assessment and Plan:      Diagnosis Orders   1. Stage 3b chronic kidney disease ([facility] [address]     2. Hypercalcemia     3. Hyperkalemia       PLAN:  Lab result discussed patient and spouse. They understood. Addressed their questions. Serum creatinine is increased to 1.6 mg/dL from 1.2 mg/dL about 12 months ago. Serum calcium is slightly high at 10.8 mg/dL. Serum potassium is slightly high at 5.4 mEq/L. Vitamin D level is normal   PTH is normal   Medications reviewed  Discontinue vitamin D. Increase fluid intake  Repeat potassium is pending today  Patient apparently did not get Kayexalate as  instructed  Veltassa 8.4 g daily for 3 days if  serum potassium is still high today. Office sample provided to reviewed   Return visit in 3 months not 12 months with BMP, random urine protein creatinine ratio, kappa with lambda free light chain assay ratio, PTH and vitamin D level respectively. Patient Active Problem List   Diagnosis    NAILA (acute kidney injury) ([facility]    CKD (chronic kidney disease) stage 3, GFR 30-59 ml/min ([facility]    [facility]    HTN (hypertension)    Anemia    Gout    Yeast UTI    Vitamin D deficiency    Hyperkalemia           Subjective:   Chief complaint:  Chief Complaint   Patient presents with    Chronic Kidney Disease     Stage IIIb      HPI:This is a follow up visit for Mr. Tia Mora. .  No chest pain. Who is here today for return appointment. I see him for chronic kidney disease. He was last seen by telehealth video 12 months ago. Since then, he has had prostate surgery   No new medications. No shortness of breath. He is chronically on oxygen. No nausea vomiting. No fever chills. He ambulates with walker due to abnormal gait. .      ROS:  Pertinent positives stated above in HPI. All other systems were reviewed and were negative.   Medications:     Current Outpatient Medications   Medication Sig Dispense Refill    Coenzyme Q10 (COQ10) 200 MG CAPS Take by mouth      vitamin D 25 MCG (1000 UT) CAPS Take 1,000 Units by mouth daily      zinc gluconate 50 MG tablet Take 50 mg by mouth daily      senna (SENOKOT) 8.6 MG TABS tablet Take 1 tablet by mouth as needed for Constipation      furosemide (LASIX) 20 MG tablet Take 20 mg by mouth daily      budesonide (PULMICORT) 0.5 MG/2ML nebulizer suspension Take 1 ampule by nebulization 2 times daily      Arformoterol Tartrate (BROVANA IN) Inhale into the lungs      tiotropium (SPIRIVA HANDIHALER) 18 MCG inhalation capsule Inhale 18 mcg into the lungs daily      MILK THISTLE PO Take 1,000 mg by mouth daily       aspirin 81 MG tablet Take 81 mg by mouth daily      OXYGEN Inhale  into the lungs as needed.  allopurinol (ZYLOPRIM) 300 MG tablet Take 100 mg by mouth daily.  atorvastatin (LIPITOR) 40 MG tablet Take 40 mg by mouth daily       fenofibrate (TRICOR) 145 MG tablet Take 145 mg by mouth daily.  lisinopril (PRINIVIL;ZESTRIL) 2.5 MG tablet Take 2.5 mg by mouth daily.  sodium polystyrene (SPS) 15 GM/60ML suspension 15 grams daily x2 days (Patient not taking: Reported on 11/11/2021) 240 mL 0     No current facility-administered medications for this visit.        Lab Results:    CBC:   Lab Results   Component Value Date    WBC 6.5 06/26/2019    HGB 12.5 (A) 06/26/2019    HCT 37.1 (A) 06/26/2019     06/26/2019     BMP:    Lab Results   Component Value Date     11/04/2021     (L) 02/09/2021     02/04/2021    K 5.4 (H) 11/04/2021    K 4.1 02/09/2021    K 3.4 (L) 02/04/2021     (L) 11/04/2021    CL 94 (L) 02/09/2021     02/04/2021    CO2 33 (H) 11/04/2021    CO2 38 (H) 02/09/2021    CO2 35 (H) 02/04/2021    BUN 36 (H) 11/04/2021    BUN 17 02/09/2021    BUN 17 02/04/2021    CREATININE 1.64 (H) 11/04/2021    CREATININE 1.01 02/09/2021    CREATININE 0.87 02/04/2021    GLUCOSE 95 11/04/2021    GLUCOSE 96 02/09/2021 GLUCOSE 103 02/04/2021      Hepatic:   Lab Results   Component Value Date    AST 24 06/26/2019    AST 20 07/26/2018    ALT 23 06/26/2019    ALT 23 07/26/2018    BILITOT 0.6 06/26/2019    BILITOT 0.5 07/26/2018    ALKPHOS 48 06/26/2019    ALKPHOS 42 07/26/2018     BNP: No results found for: BNP  Lipids:   Lab Results   Component Value Date    CHOL 105 07/26/2018    HDL 35 07/26/2018     INR: No results found for: INR  URINE:   Lab Results   Component Value Date    PROTUR Negative 08/08/2017     Lab Results   Component Value Date    NITRU Negative 08/08/2017    COLORU Yellow 08/08/2017    WBCUA >100 08/08/2017    RBCUA 21-50 08/08/2017    MUCUS Present 12/03/2014    YEAST Present 06/30/2015    BACTERIA Trace 08/08/2017    LEUKOCYTESUR Large 08/08/2017    UROBILINOGEN <2.0 E.U./dL 08/08/2017    BILIRUBINUR Negative 08/08/2017    GLUCOSEU Negative 08/08/2017    KETUA Negative 08/08/2017    AMORPHOUS Present 12/03/2014      Microalbumen/Creatinine ratio:  No components found for: RUCREAT    Objective:   Vitals: /67 (Site: Left Upper Arm, Position: Sitting, Cuff Size: Medium Adult)   Pulse 68   Temp 98.1 °F (36.7 °C)   Wt 167 lb 12.8 oz (76.1 kg)   SpO2 95%   BMI 22.76 kg/m²      Constitutional:  Alert, awake, no apparent distress  Skin:normal with no rash or any lesions  HEENT:Pupils are reactive . Throat is clear. Oral mucosa is moist.  Neck:supple with no thyromegaly or bruit   Cardiovascular:  S1, S2 without murmur   Respiratory:  Clear to auscultation with no wheezes or rales  Abdomen: +bowel sound, soft, non tender and no bruit  Ext: Trace to 1+ bilateral LE edema  Musculoskeletal:Intact  Neuro:Alert, awake and oriented with no obvious focal deficit. Speech is normal.    Electronically signed by Mala Sandhu MD on 11/11/2021 at 2:17 PM   **This report has been created using voice recognition software. It maycontain minor  errors which are inherent in voice recognition technology.

## 2021-11-15 ENCOUNTER — TELEPHONE (OUTPATIENT)
Dept: NEPHROLOGY | Age: 81
End: 2021-11-15

## 2021-11-15 NOTE — TELEPHONE ENCOUNTER
Pamela Liu called today. He said that he had his potassium checked on 11/11/21 and wanted to know if he is to take the Kayexalate? Potassium level was 4.0. on 11/11/21. When would you like a potassium level rechecked? You do not want him to take the Kayexalate correct.

## 2022-02-08 LAB
ANION GAP SERPL CALCULATED.3IONS-SCNC: 6 MMOL/L (ref 4–12)
BUN BLDV-MCNC: 53 MG/DL (ref 7–20)
CALCIUM SERPL-MCNC: 11.6 MG/DL (ref 8.8–10.5)
CHLORIDE BLD-SCNC: 98 MEQ/L (ref 101–111)
CO2: 34 MEQ/L (ref 21–32)
CREAT SERPL-MCNC: 2.13 MG/DL (ref 0.6–1.3)
CREATININE CLEARANCE: 30
CREATININE, RANDOM URINE: 38.9 MG/DL
GLUCOSE: 86 MG/DL (ref 70–110)
PARATHYROID HORMONE INTACT: 42.4 U/ML (ref 12–88)
POTASSIUM SERPL-SCNC: 5.9 MEQ/L (ref 3.6–5)
PROTEIN, URINE, RANDOM: 9.4 MG/DL
PROTEIN/CREAT RATIO: 0.24 G/1.73M2
SODIUM BLD-SCNC: 138 MEQ/L (ref 135–145)
VITAMIN D 25-HYDROXY: 53.1 NG/ML (ref 30–100)

## 2022-02-09 ENCOUNTER — TELEPHONE (OUTPATIENT)
Dept: NEPHROLOGY | Age: 82
End: 2022-02-09

## 2022-02-09 DIAGNOSIS — E87.5 HYPERKALEMIA: Primary | ICD-10-CM

## 2022-02-09 NOTE — TELEPHONE ENCOUNTER
----- Message from Emile Meraz MD sent at 2/9/2022  5:35 AM EST -----  Serum potassium is high  Lokelma 10 gm daily for three days   Low potassium diet   Repeat serum potassium in 5 days

## 2022-02-09 NOTE — TELEPHONE ENCOUNTER
I called and spoke to patient he has Kayexalate 15 g 1 bottle it says take 4 TBSP for 2 days. He got this in November and never took it. He also has Veltassa 8.4 g samples at home that he never took that he has had since November. He said he just held onto them and repeated his blood tests and they were normal so he did not have to take anything. What would you like him to do?

## 2022-02-10 ENCOUNTER — OFFICE VISIT (OUTPATIENT)
Dept: NEPHROLOGY | Age: 82
End: 2022-02-10
Payer: MEDICARE

## 2022-02-10 VITALS
HEIGHT: 72 IN | HEART RATE: 72 BPM | TEMPERATURE: 96.8 F | DIASTOLIC BLOOD PRESSURE: 67 MMHG | BODY MASS INDEX: 21.67 KG/M2 | SYSTOLIC BLOOD PRESSURE: 113 MMHG | WEIGHT: 160 LBS | OXYGEN SATURATION: 97 %

## 2022-02-10 DIAGNOSIS — N17.9 AKI (ACUTE KIDNEY INJURY) (HCC): Primary | ICD-10-CM

## 2022-02-10 DIAGNOSIS — N18.2 CHRONIC KIDNEY DISEASE, STAGE II (MILD): ICD-10-CM

## 2022-02-10 DIAGNOSIS — E87.5 HYPERKALEMIA: ICD-10-CM

## 2022-02-10 DIAGNOSIS — E83.52 HYPERCALCEMIA: ICD-10-CM

## 2022-02-10 DIAGNOSIS — N18.32 STAGE 3B CHRONIC KIDNEY DISEASE (HCC): ICD-10-CM

## 2022-02-10 LAB — FREE LIGHT CHAINS,SERUM: ABNORMAL

## 2022-02-10 PROCEDURE — G8420 CALC BMI NORM PARAMETERS: HCPCS | Performed by: INTERNAL MEDICINE

## 2022-02-10 PROCEDURE — 1036F TOBACCO NON-USER: CPT | Performed by: INTERNAL MEDICINE

## 2022-02-10 PROCEDURE — G8484 FLU IMMUNIZE NO ADMIN: HCPCS | Performed by: INTERNAL MEDICINE

## 2022-02-10 PROCEDURE — 1123F ACP DISCUSS/DSCN MKR DOCD: CPT | Performed by: INTERNAL MEDICINE

## 2022-02-10 PROCEDURE — 99213 OFFICE O/P EST LOW 20 MIN: CPT | Performed by: INTERNAL MEDICINE

## 2022-02-10 PROCEDURE — 4040F PNEUMOC VAC/ADMIN/RCVD: CPT | Performed by: INTERNAL MEDICINE

## 2022-02-10 PROCEDURE — G8427 DOCREV CUR MEDS BY ELIG CLIN: HCPCS | Performed by: INTERNAL MEDICINE

## 2022-02-10 NOTE — PROGRESS NOTES
Renal Progress Note    Assessment and Plan:       Diagnosis Orders   1. NAILA (acute kidney injury) ([facility] [address]  IR BIOPSY KIDNEY PERCUTANEOUS    APTT    [place]/Lambda Free Lt Chains, Serum Quant    C3 Complement    C4 Complement    BECCA Screen with Reflex    Basic Metabolic Panel   2. Stage 3b chronic kidney disease ([address]     3. Hyperkalemia     4. Chronic kidney disease, stage II (mild)     5. Hypercalcemia  IR BIOPSY KIDNEY PERCUTANEOUS   PLAN:   Lab result discussed with the patient. He understood. I addressed his questions. Serum creatinine is increased to 2.13 mg/dL from 1.64 mg/dL. Potassium level is increased to 5.9 mEq/L from 4.0 mEq/L. Serum calcium is increased 11.6 mg/dL from 10.8 mg/dL. Vitamin D level is normal.  PTH is normal.   Urine protein is 240 mg. With a combination of her worsening kidney function and the presence of proteinuria, hypercalcemia, differential diagnosis would definitely be  multiple myeloma/monoclonal gammopathy of renal significance  I discussed that with them  He needs a kidney biopsy  The procedure discussed with them. They are agreeable  We will schedule for next week  APTT today  Serologies  Kappa with lambda free light chain assay ratio  Return visit in 2 weeks with labs        Patient Active Problem List   Diagnosis    NAILA (acute kidney injury) ([facility] [address]    CKD (chronic kidney disease) stage 3, GFR 30-59 ml/min [facility]    COPD (chronic obstructive pulmonary disease) ([facility])    HTN (hypertension)    Anemia    Gout    Yeast UTI    Vitamin D deficiency    Hyperkalemia           Subjective:   Chief complaint:  Chief Complaint   Patient presents with    Chronic Kidney Disease      HPI:This is a follow up visit for Mr. Gene Berry is here today for return appointment. I see him for chronic kidney disease. He was last seen about 3 months ago. No specific complaint today. His potassium level was recently high at 5.9 mEq/L. He was asked to take [place] for 3 days.   Repeat potassium level is still pending. No chest pain . No diarrhea. No nausea or vomiting. Jay Aaron He ambulates with a walker due to abnormal gait. He has chronic shortness of breath exacerbated by activities and relieved by oxygen and rest.    ROS:  Pertinent positives stated above in HPI. All other systems were reviewed and were negative. Medications:     Current Outpatient Medications   Medication Sig Dispense Refill    Coenzyme Q10 (COQ10) 200 MG CAPS Take by mouth      zinc gluconate 50 MG tablet Take 50 mg by mouth daily      senna (SENOKOT) 8.6 MG TABS tablet Take 1 tablet by mouth as needed for Constipation      sodium polystyrene (SPS) 15 GM/60ML suspension 15 grams daily x2 days 240 mL 0    furosemide (LASIX) 20 MG tablet Take 20 mg by mouth daily      budesonide (PULMICORT) 0.5 MG/2ML nebulizer suspension Take 1 ampule by nebulization 2 times daily      Arformoterol Tartrate (BROVANA IN) Inhale into the lungs      tiotropium (SPIRIVA HANDIHALER) 18 MCG inhalation capsule Inhale 18 mcg into the lungs daily      MILK THISTLE PO Take 1,000 mg by mouth daily       aspirin 81 MG tablet Take 81 mg by mouth daily      OXYGEN Inhale  into the lungs as needed.  allopurinol (ZYLOPRIM) 300 MG tablet Take 100 mg by mouth daily.  atorvastatin (LIPITOR) 40 MG tablet Take 40 mg by mouth daily       fenofibrate (TRICOR) 145 MG tablet Take 145 mg by mouth daily.  lisinopril (PRINIVIL;ZESTRIL) 2.5 MG tablet Take 2.5 mg by mouth daily. No current facility-administered medications for this visit.        Lab Results:    CBC:   Lab Results   Component Value Date    WBC 6.5 06/26/2019    HGB 12.5 (A) 06/26/2019    HCT 37.1 (A) 06/26/2019     06/26/2019     BMP:    Lab Results   Component Value Date     02/08/2022     11/04/2021     (L) 02/09/2021    K 5.9 (H) 02/08/2022    K 4.0 11/11/2021    K 5.4 (H) 11/04/2021    CL 98 (L) 02/08/2022     (L) 11/04/2021    CL 94 (L) 02/09/2021    CO2 34 (H) 02/08/2022    CO2 33 (H) 11/04/2021    CO2 38 (H) 02/09/2021    BUN 53 (H) 02/08/2022    BUN 36 (H) 11/04/2021    BUN 17 02/09/2021    CREATININE 2.13 (H) 02/08/2022    CREATININE 1.64 (H) 11/04/2021    CREATININE 1.01 02/09/2021    GLUCOSE 86 02/08/2022    GLUCOSE 95 11/04/2021    GLUCOSE 96 02/09/2021      Hepatic:   Lab Results   Component Value Date    AST 24 06/26/2019    AST 20 07/26/2018    ALT 23 06/26/2019    ALT 23 07/26/2018    BILITOT 0.6 06/26/2019    BILITOT 0.5 07/26/2018    ALKPHOS 48 06/26/2019    ALKPHOS 42 07/26/2018     BNP: No results found for: BNP  Lipids:   Lab Results   Component Value Date    CHOL 105 07/26/2018    HDL 35 07/26/2018     INR: No results found for: INR  URINE:   Lab Results   Component Value Date    PROTUR Negative 08/08/2017     Lab Results   Component Value Date    NITRU Negative 08/08/2017    COLORU Yellow 08/08/2017    WBCUA >100 08/08/2017    RBCUA 21-50 08/08/2017    MUCUS Present 12/03/2014    YEAST Present 06/30/2015    BACTERIA Trace 08/08/2017    LEUKOCYTESUR Large 08/08/2017    UROBILINOGEN <2.0 E.U./dL 08/08/2017    BILIRUBINUR Negative 08/08/2017    GLUCOSEU Negative 08/08/2017    KETUA Negative 08/08/2017    AMORPHOUS Present 12/03/2014      Microalbumen/Creatinine ratio:  No components found for: RUCREAT    Objective:   Vitals: /67 (Site: Left Upper Arm, Position: Sitting, Cuff Size: Small Adult)   Pulse 72   Temp 96.8 °F (36 °C)   Ht 6' (1.829 m)   Wt 160 lb (72.6 kg)   SpO2 97%   BMI 21.70 kg/m²      Constitutional:  Alert, awake, no apparent distress  Skin:normal with no rash or any significant lesions  HEENT:Pupils are reactive . Throat is clear.   Oral mucosa is moist.  Neck:supple with no thyromegaly, JVD, lymphadenopathy or bruit   Cardiovascular: Regular sinus rhythm without murmur, rubs or gallops   Respiratory:  Clear to auscultation with no wheezes or rales  Abdomen: Good bowel sound, soft, non tender and no bruit  Ext: No LE edema  Musculoskeletal:Intact  Neuro:Alert, awake and oriented with no obvious focal deficit. Speech is normal.    Electronically signed by Lore Soria MD on 2/10/2022 at 2:17 PM   **This report has been created using voice recognition software. It maycontain minor  errors which are inherent in voice recognition technology. **

## 2022-02-14 ENCOUNTER — NURSE ONLY (OUTPATIENT)
Dept: LAB | Age: 82
End: 2022-02-14

## 2022-02-14 DIAGNOSIS — E87.5 HYPERKALEMIA: ICD-10-CM

## 2022-02-14 DIAGNOSIS — N17.9 AKI (ACUTE KIDNEY INJURY) (HCC): ICD-10-CM

## 2022-02-14 LAB
APTT: 32.9 SECONDS (ref 22–38)
POTASSIUM SERPL-SCNC: 4.9 MEQ/L (ref 3.5–5.2)

## 2022-02-15 ENCOUNTER — TELEPHONE (OUTPATIENT)
Dept: NEPHROLOGY | Age: 82
End: 2022-02-15

## 2022-02-15 NOTE — TELEPHONE ENCOUNTER
----- Message from Tor Veliz MD sent at 2/14/2022  4:35 PM EST -----  Serum potassium is back to normal

## 2022-02-15 NOTE — TELEPHONE ENCOUNTER
I called the pt to inform him of this. He understood. Pt is due to have kidney biopsy next week. He states he has been coughing since about 1/18/22. He coughs up phlegm. He does not have any other symptoms. Will he be able to go ahead w/kidney biopsy?

## 2022-02-21 ENCOUNTER — NURSE ONLY (OUTPATIENT)
Dept: LAB | Age: 82
End: 2022-02-21

## 2022-02-21 DIAGNOSIS — N17.9 AKI (ACUTE KIDNEY INJURY) (HCC): ICD-10-CM

## 2022-02-21 LAB
ANION GAP SERPL CALCULATED.3IONS-SCNC: 11 MEQ/L (ref 8–16)
BUN BLDV-MCNC: 28 MG/DL (ref 7–22)
CALCIUM SERPL-MCNC: 10.8 MG/DL (ref 8.5–10.5)
CHLORIDE BLD-SCNC: 96 MEQ/L (ref 98–111)
CO2: 32 MEQ/L (ref 23–33)
CREAT SERPL-MCNC: 1.6 MG/DL (ref 0.4–1.2)
GFR SERPL CREATININE-BSD FRML MDRD: 42 ML/MIN/1.73M2
GLUCOSE BLD-MCNC: 101 MG/DL (ref 70–108)
POTASSIUM SERPL-SCNC: 4.6 MEQ/L (ref 3.5–5.2)
SODIUM BLD-SCNC: 139 MEQ/L (ref 135–145)

## 2022-02-22 RX ORDER — FENTANYL CITRATE 50 UG/ML
50 INJECTION, SOLUTION INTRAMUSCULAR; INTRAVENOUS ONCE
Status: CANCELLED | OUTPATIENT
Start: 2022-02-22 | End: 2022-02-22

## 2022-02-22 RX ORDER — SODIUM CHLORIDE 450 MG/100ML
INJECTION, SOLUTION INTRAVENOUS CONTINUOUS
Status: CANCELLED | OUTPATIENT
Start: 2022-02-22

## 2022-02-22 RX ORDER — MIDAZOLAM HYDROCHLORIDE 1 MG/ML
1 INJECTION INTRAMUSCULAR; INTRAVENOUS ONCE
Status: CANCELLED | OUTPATIENT
Start: 2022-02-22 | End: 2022-02-22

## 2022-02-23 ENCOUNTER — HOSPITAL ENCOUNTER (OUTPATIENT)
Dept: CT IMAGING | Age: 82
Discharge: HOME | End: 2022-02-23
Payer: MEDICARE

## 2022-02-23 VITALS
WEIGHT: 161 LBS | OXYGEN SATURATION: 100 % | DIASTOLIC BLOOD PRESSURE: 62 MMHG | TEMPERATURE: 97.5 F | HEART RATE: 58 BPM | BODY MASS INDEX: 21.84 KG/M2 | SYSTOLIC BLOOD PRESSURE: 124 MMHG | RESPIRATION RATE: 22 BRPM

## 2022-02-23 DIAGNOSIS — N17.9 AKI (ACUTE KIDNEY INJURY) (HCC): ICD-10-CM

## 2022-02-23 LAB
ANA SCREEN: NORMAL
C3 COMPLEMENT: 150 MG/DL (ref 90–180)
COMPLEMENT C4: 33 MG/DL (ref 10–40)
CREAT SERPL-MCNC: 1.8 MG/DL (ref 0.4–1.2)
ERYTHROCYTE [DISTWIDTH] IN BLOOD BY AUTOMATED COUNT: 16.3 % (ref 11.5–14.5)
ERYTHROCYTE [DISTWIDTH] IN BLOOD BY AUTOMATED COUNT: 55.6 FL (ref 35–45)
GFR SERPL CREATININE-BSD FRML MDRD: 36 ML/MIN/1.73M2
HCT VFR BLD CALC: 36.3 % (ref 42–52)
HEMOGLOBIN: 11.7 GM/DL (ref 14–18)
KAPPA/LAMBDA FREE LIGHT CHAINS: NORMAL
MCH RBC QN AUTO: 29.9 PG (ref 26–33)
MCHC RBC AUTO-ENTMCNC: 32.2 GM/DL (ref 32.2–35.5)
MCV RBC AUTO: 92.8 FL (ref 80–94)
PLATELET # BLD: 201 THOU/MM3 (ref 130–400)
PMV BLD AUTO: 10.3 FL (ref 9.4–12.4)
RBC # BLD: 3.91 MILL/MM3 (ref 4.7–6.1)
WBC # BLD: 7.1 THOU/MM3 (ref 4.8–10.8)

## 2022-02-23 PROCEDURE — 88305 TISSUE EXAM BY PATHOLOGIST: CPT

## 2022-02-23 PROCEDURE — 85027 COMPLETE CBC AUTOMATED: CPT

## 2022-02-23 PROCEDURE — 88348 ELECTRON MICROSCOPY DX: CPT

## 2022-02-23 PROCEDURE — 6360000002 HC RX W HCPCS: Performed by: RADIOLOGY

## 2022-02-23 PROCEDURE — 82565 ASSAY OF CREATININE: CPT

## 2022-02-23 PROCEDURE — 77012 CT SCAN FOR NEEDLE BIOPSY: CPT

## 2022-02-23 PROCEDURE — 88350 IMFLUOR EA ADDL 1ANTB STN PX: CPT

## 2022-02-23 PROCEDURE — 88346 IMFLUOR 1ST 1ANTB STAIN PX: CPT

## 2022-02-23 PROCEDURE — 6370000000 HC RX 637 (ALT 250 FOR IP): Performed by: RADIOLOGY

## 2022-02-23 PROCEDURE — 88313 SPECIAL STAINS GROUP 2: CPT

## 2022-02-23 PROCEDURE — 49180 BIOPSY ABDOMINAL MASS: CPT

## 2022-02-23 PROCEDURE — 2580000003 HC RX 258: Performed by: RADIOLOGY

## 2022-02-23 RX ORDER — IBUPROFEN 200 MG
TABLET ORAL ONCE
Status: COMPLETED | OUTPATIENT
Start: 2022-02-23 | End: 2022-02-23

## 2022-02-23 RX ORDER — SODIUM CHLORIDE 450 MG/100ML
INJECTION, SOLUTION INTRAVENOUS CONTINUOUS
Status: DISCONTINUED | OUTPATIENT
Start: 2022-02-23 | End: 2022-02-24 | Stop reason: HOSPADM

## 2022-02-23 RX ORDER — MIDAZOLAM HYDROCHLORIDE 1 MG/ML
1 INJECTION INTRAMUSCULAR; INTRAVENOUS ONCE
Status: COMPLETED | OUTPATIENT
Start: 2022-02-23 | End: 2022-02-23

## 2022-02-23 RX ORDER — FENTANYL CITRATE 50 UG/ML
50 INJECTION, SOLUTION INTRAMUSCULAR; INTRAVENOUS ONCE
Status: COMPLETED | OUTPATIENT
Start: 2022-02-23 | End: 2022-02-23

## 2022-02-23 RX ADMIN — MIDAZOLAM 0.5 MG: 1 INJECTION INTRAMUSCULAR; INTRAVENOUS at 09:01

## 2022-02-23 RX ADMIN — SODIUM CHLORIDE: 4.5 INJECTION, SOLUTION INTRAVENOUS at 07:22

## 2022-02-23 RX ADMIN — BACITRACIN, NEOMYCIN, POLYMYXIN B 0.9 G: 400; 3.5; 5 OINTMENT TOPICAL at 09:44

## 2022-02-23 RX ADMIN — FENTANYL CITRATE 25 MCG: 50 INJECTION INTRAMUSCULAR; INTRAVENOUS at 09:02

## 2022-02-23 ASSESSMENT — PAIN SCALES - GENERAL
PAINLEVEL_OUTOF10: 0

## 2022-02-23 ASSESSMENT — PAIN - FUNCTIONAL ASSESSMENT: PAIN_FUNCTIONAL_ASSESSMENT: 0-10

## 2022-02-23 NOTE — PROGRESS NOTES
Department of Radiology  Post Procedure Progress Note      Pre-Procedure Diagnosis:  AKA    Procedure Performed:  CT guided renal biopsy    Anesthesia: local / versed and dilaudid    Findings: successful    Immediate Complications:  None    Estimated Blood Loss: minimal    SEE DICTATED PROCEDURE NOTE FOR COMPLETE DETAILS.     Akil Marquez MD   2/23/2022 10:09 AM

## 2022-02-23 NOTE — PROGRESS NOTES
__m__ Safety:       (Environmental)   [place] to environment   Ensure ID band is correct and in place/ allergy band as needed   Assess for fall risk   Initiate fall precautions as applicable (fall band, side rails, etc.)   Call light within reach   Bed in low position/ wheels locked    _m___ Pain:        Assess pain level and characteristics   Administer analgesics as ordered   Assess effectiveness of pain management and report to MD as needed    _m___ Knowledge Deficit:   Assess baseline knowledge   Provide teaching at level of understanding   Provide teaching via preferred learning method   Evaluate teaching effectiveness    _m___ Hemodynamic/Respiratory Status:       (Pre and Post Procedure Monitoring)   Assess/Monitor vital signs and LOC   Assess Baseline SpO2 prior to any sedation   Obtain weight/height   Assess vital signs/ LOC until patient meets discharge criteria   Monitor procedure site and notify MD of any issues    _

## 2022-02-23 NOTE — PROGRESS NOTES
0827 Pt in CT scanning for CT guided renal biopsy. Explained procedure to pt and family and verbalizes understanding. 0830 Dr Hailee Manuel to speak to pt. Consent signed. 0841 Pt attached to  Monitor. 0848 Pt attempted to position prone on table and unable to tolerate with breathing. Dr Hailee Manuel informed,.  8105 Pt positioned on right side on scanner. 4018 Pre biopsy scans taken. 0857 Pt repositioned on left side on scanner. 7230 First core biopsy sample obtained. 5184 Second core biopsy sample obtained. 2104 Third core biopsy sample obtained. 5557 Fourth core biopsy sample obtained. Sent to lab. Explained to pt awaiting call from lab and pt verbalizes understanding. 0932 Surgicel injected per Dr Hailee Manuel and post scan taken. [id number] Specimen adequate. Needle removed per Dr Hailee Manuel. Pressure applied to site. 7742 Biopsy complete. Triple antibiotic ointment applied to site on right lower back with 2 x 2 and op-site dressing. Site without redness, swelling or hematoma. 0949 Pt positioned on cart for comfort. Denies any pain. 0957 Pt transferred to [facility] per cart. Report called to Kayce Rodriguez.

## 2022-02-23 NOTE — H&P
[address] [address]  Sedation/Analgesia History & Physical    Pt Name: Karen Key  MRN: [id number]  YOB: 1940  Provider Performing Procedure: Eva Carver MD, MD  Primary Care Physician: Damari Summers, MAEVE - ALEX    PRE-PROCEDURE   DNR-CCA/DNR-CC []Yes [x]No  Brief History/Pre-Procedure Diagnosis: AKA          MEDICAL HISTORY  []CAD/Valve  []Liver Disease  []Lung Disease []Diabetes  []Hypertension []Renal Disease  []Additional information:       has a past medical history of CKD (chronic kidney disease) stage 3, GFR 30-59 ml/min ([facility] [address] COPD (chronic obstructive pulmonary disease) ([facility] [address] Gout, and HTN (hypertension). SURGICAL HISTORY   has a past surgical history that includes Esophagus surgery (2015) and TURP. Additional information:       ALLERGIES   Allergies as of 02/23/2022 - Fully Reviewed 02/23/2022   Allergen Reaction Noted    Sulfamethoxazole-trimethoprim  12/02/2014     Additional information:       MEDICATIONS   Coumadin Use Last 5 Days [x]No []Yes  Antiplatelet drug therapy use last 5 days  [x]No []Yes  Other anticoagulant use last 5 days  [x]No []Yes    Current Outpatient Medications:     Coenzyme Q10 (COQ10) 200 MG CAPS, Take by mouth, Disp: , Rfl:     zinc gluconate 50 MG tablet, Take 50 mg by mouth daily, Disp: , Rfl:     furosemide (LASIX) 20 MG tablet, Take 20 mg by mouth daily, Disp: , Rfl:     budesonide (PULMICORT) 0.5 MG/2ML nebulizer suspension, Take 1 ampule by nebulization 2 times daily, Disp: , Rfl:     Arformoterol Tartrate (BROVANA IN), Inhale into the lungs, Disp: , Rfl:     tiotropium (SPIRIVA HANDIHALER) 18 MCG inhalation capsule, Inhale 18 mcg into the lungs daily, Disp: , Rfl:     MILK THISTLE PO, Take 1,000 mg by mouth daily , Disp: , Rfl:     OXYGEN, Inhale  into the lungs as needed. , Disp: , Rfl:     allopurinol (ZYLOPRIM) 300 MG tablet, Take 100 mg by mouth daily. , Disp: , Rfl:     atorvastatin (LIPITOR) 40 MG tablet, Take 40 mg by mouth daily , Disp: , Rfl:     fenofibrate (TRICOR) 145 MG tablet, Take 145 mg by mouth daily. , Disp: , Rfl:     senna (SENOKOT) 8.6 MG TABS tablet, Take 1 tablet by mouth as needed for Constipation, Disp: , Rfl:     sodium polystyrene (SPS) 15 GM/60ML suspension, 15 grams daily x2 days, Disp: 240 mL, Rfl: 0    aspirin 81 MG tablet, Take 81 mg by mouth daily, Disp: , Rfl:     lisinopril (PRINIVIL;ZESTRIL) 2.5 MG tablet, Take 2.5 mg by mouth daily. , Disp: , Rfl:     Current Facility-Administered Medications:     0.45 % sodium chloride infusion, , IntraVENous, Continuous, Gordon Prakash MD, Last Rate: 20 mL/hr at 02/23/22 3096, New Bag at 02/23/22 6502  Prior to Admission medications    Medication Sig Start Date End Date Taking? Authorizing Provider   Coenzyme Q10 (COQ10) 200 MG CAPS Take by mouth   Yes Historical Provider, MD   zinc gluconate 50 MG tablet Take 50 mg by mouth daily   Yes Historical Provider, MD   furosemide (LASIX) 20 MG tablet Take 20 mg by mouth daily   Yes Historical Provider, MD   budesonide (PULMICORT) 0.5 MG/2ML nebulizer suspension Take 1 ampule by nebulization 2 times daily   Yes Historical Provider, MD   Arformoterol Tartrate (BROVANA IN) Inhale into the lungs   Yes Historical Provider, MD   tiotropium (SPIRIVA HANDIHALER) 18 MCG inhalation capsule Inhale 18 mcg into the lungs daily   Yes Historical Provider, MD   MILK THISTLE PO Take 1,000 mg by mouth daily    Yes Historical Provider, MD   OXYGEN Inhale  into the lungs as needed. Yes Historical Provider, MD   allopurinol (ZYLOPRIM) 300 MG tablet Take 100 mg by mouth daily. Yes Historical Provider, MD   atorvastatin (LIPITOR) 40 MG tablet Take 40 mg by mouth daily    Yes Historical Provider, MD   fenofibrate (TRICOR) 145 MG tablet Take 145 mg by mouth daily.    Yes Historical Provider, MD   senna (SENOKOT) 8.6 MG TABS tablet Take 1 tablet by mouth as needed for Constipation    Historical Provider, MD   sodium polystyrene (SPS) 15 GM/60ML suspension 15 grams daily x2 days 11/8/21   Canelo Chavez MD   aspirin 81 MG tablet Take 81 mg by mouth daily    Historical Provider, MD   lisinopril (PRINIVIL;ZESTRIL) 2.5 MG tablet Take 2.5 mg by mouth daily. Historical Provider, MD     Additional information:       VITAL SIGNS   Vitals:    02/23/22 1003   BP: (!) 122/56   Pulse: 59   Resp: 16   Temp:    SpO2: 95%       PHYSICAL:   Heart:  [x]Regular rate and rhythm  []Other:    Lungs:  [x]Clear    []Other:    Abdomen: [x]Soft    []Other:    Mental Status: [x]Alert & Oriented  []Other:      PLANNED PROCEDURE   [x]Biospy []Arteriogram              []Drainage   []Mediport Insertion  []Fistulogram []IV access       []Vertebroplasty / Augmentation  []IVC filter []Dialysis catheter []Biliary drainage  []Other: []CAPD Catheter []Nephrostomy Tube / Stent  SEDATION  Planned agent:[x]Midazolam []Meperidine []Sublimaze [x]Dilaudid []Morphine     []Diazepam  []Other:     ASA Classification:  []1 [x]2 []3 []4 []5  Class 1: A normal healthy patient  Class 2: Pt with mild to moderate systemic disease  Class 3: Severe systemic disease or disturbance  Class 4: Severe systemic disorders that are already life threatening. Class 5: Moribund pt with little chances of survival, for more than 24 hours. Mallampati I Airway Classification:   []1 [x]2 []3 []4    [x]Pre-procedure diagnostic studies complete and results available. Comment:    [x]Previous sedation/anesthesia experiences assessed. Comment:    [x]The patient is an appropriate candidate to undergo the planned procedure sedation and anesthesia. (Refer to nursing sedation/analgesia documentation record)  [x]Formulation and discussion of sedation/procedure plan, risks, and expectations with patient and/or responsible adult completed. [x]Patient examined immediately prior to the procedure.  (Refer to nursing sedation/analgesia documentation record)    Margarita Meyers MD, MD  Electronically signed 2/23/2022 at 10:08 AM

## 2022-02-23 NOTE — PROGRESS NOTES
1000: Patient back from procedure. States no pain at this time. Gauze dressing clean, dry, intact. No redness or edema noted around site. Vitals as charted. Provided with beverage and snack.

## 2022-02-23 NOTE — PROGRESS NOTES
Formulation and discussion of sedation / procedure plans, risks, benefits, side effects and alternatives with patient and/or responsible adult completed.     Electronically signed by Bucky Puentes MD on 2/23/2022 at 10:07 AM

## 2022-03-01 ENCOUNTER — TELEPHONE (OUTPATIENT)
Dept: NEPHROLOGY | Age: 82
End: 2022-03-01

## 2022-03-01 DIAGNOSIS — R10.84 GENERALIZED ABDOMINAL PAIN: Primary | ICD-10-CM

## 2022-03-01 NOTE — TELEPHONE ENCOUNTER
Spoke to Olvin Locke he said he was having chest pain this morning. I let him know that he should go to the hospital if that happens again.  He is scheduled for the CTA for tomorrow 3/2/22 at [facility] at 2 pm arrive at 1:45 pm.

## 2022-03-01 NOTE — TELEPHONE ENCOUNTER
I was just going to call him. The biopsy report came back high blood pressure or hypertension is what is damaging the kidneys without any other findings. Sometimes people can bleed on the inside after kidney biopsy but this is too late for such.   However, will get  CAT scan of the abdomen without intravenous contrast.

## 2022-03-01 NOTE — TELEPHONE ENCOUNTER
Pt called and states he has been having a lot of abdominal pain, back pain and his head has been bothering him. He wants to know if the biopsy could have caused this? What should he do?

## 2022-03-02 LAB — MISC REFERENCE: NORMAL

## 2022-03-03 ENCOUNTER — TELEPHONE (OUTPATIENT)
Dept: NEPHROLOGY | Age: 82
End: 2022-03-03

## 2022-03-03 NOTE — TELEPHONE ENCOUNTER
----- Message from Hai Paez MD sent at 3/3/2022 10:08 AM EST -----  CAT scan of abdomen and pelvic report is normal

## 2022-03-07 ENCOUNTER — OFFICE VISIT (OUTPATIENT)
Dept: NEPHROLOGY | Age: 82
End: 2022-03-07
Payer: MEDICARE

## 2022-03-07 VITALS
DIASTOLIC BLOOD PRESSURE: 54 MMHG | OXYGEN SATURATION: 98 % | BODY MASS INDEX: 21.7 KG/M2 | SYSTOLIC BLOOD PRESSURE: 104 MMHG | TEMPERATURE: 97.3 F | WEIGHT: 160 LBS | HEART RATE: 67 BPM

## 2022-03-07 DIAGNOSIS — E83.52 HYPERCALCEMIA: ICD-10-CM

## 2022-03-07 DIAGNOSIS — E87.5 HYPERKALEMIA: ICD-10-CM

## 2022-03-07 DIAGNOSIS — N18.32 STAGE 3B CHRONIC KIDNEY DISEASE (HCC): ICD-10-CM

## 2022-03-07 DIAGNOSIS — N17.9 AKI (ACUTE KIDNEY INJURY) (HCC): Primary | ICD-10-CM

## 2022-03-07 DIAGNOSIS — I10 ESSENTIAL HYPERTENSION: ICD-10-CM

## 2022-03-07 DIAGNOSIS — E55.9 VITAMIN D DEFICIENCY: ICD-10-CM

## 2022-03-07 PROCEDURE — G8420 CALC BMI NORM PARAMETERS: HCPCS | Performed by: INTERNAL MEDICINE

## 2022-03-07 PROCEDURE — 1123F ACP DISCUSS/DSCN MKR DOCD: CPT | Performed by: INTERNAL MEDICINE

## 2022-03-07 PROCEDURE — 99213 OFFICE O/P EST LOW 20 MIN: CPT | Performed by: INTERNAL MEDICINE

## 2022-03-07 PROCEDURE — G8427 DOCREV CUR MEDS BY ELIG CLIN: HCPCS | Performed by: INTERNAL MEDICINE

## 2022-03-07 PROCEDURE — G8484 FLU IMMUNIZE NO ADMIN: HCPCS | Performed by: INTERNAL MEDICINE

## 2022-03-07 PROCEDURE — 4040F PNEUMOC VAC/ADMIN/RCVD: CPT | Performed by: INTERNAL MEDICINE

## 2022-03-07 PROCEDURE — 1036F TOBACCO NON-USER: CPT | Performed by: INTERNAL MEDICINE

## 2022-03-07 NOTE — PROGRESS NOTES
Renal Progress Note    Assessment and Plan:      Diagnosis Orders   1. NAILA (acute kidney injury) ([facility] [address]     2. Hyperkalemia     3. Vitamin D deficiency     4. Essential hypertension     5. Hypercalcemia     6. Stage 3b chronic kidney disease ([facility] [address]               PLAN:  1. Reviewed labs with the patient and family including the daughter a school  and the first lady of [place]   2. They understood. 3. Addressed their questions. 4. Serum creatinine is improved near baseline 1.8 mg/dL now. 5. Serum calcium is improved to 10.8 mg/dL from 11.6 mg/dl. 6. Kidney biopsy report reviewed with them. 7. No myeloma. 8. It is significant for hypertensive nephrosclerosis. 9. Medications reviewed  10. No changes  11. Return visit in 3 months with labs and may be 6 months thereafter          Patient Active Problem List   Diagnosis    NAILA (acute kidney injury) ([facility] [address]    CKD (chronic kidney disease) stage 3, GFR 30-59 ml/min ([facility])    COPD (chronic obstructive pulmonary disease) ([facility] [address]    HTN (hypertension)    Anemia    Gout    Yeast UTI    Vitamin D deficiency    Hyperkalemia           Subjective:   Chief complaint:  Chief Complaint   Patient presents with    Chronic Kidney Disease     Stage IIIb      HPI:This is a follow up visit for Mr. Yandy Grider who is here today for return appointment. I see him for chronic kidney disease. Was last seen about 4 weeks ago. He has had  worsening kidney function associated with proteinuria and hypercalcemia. Monoclonal gammopathy of renal significance was considered in differential diagnosis. As a result  kidney biopsy was done which came back as hypertensive nephrosclerosis. 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 He wears oxygen  and ambulates with a walker due to abnormal gait    ROS:  Pertinent positives stated above in HPI. All other systems were reviewed and were negative.   Medications: Current Outpatient Medications   Medication Sig Dispense Refill    Coenzyme Q10 (COQ10) 200 MG CAPS Take by mouth      zinc gluconate 50 MG tablet Take 50 mg by mouth daily      senna (SENOKOT) 8.6 MG TABS tablet Take 1 tablet by mouth as needed for Constipation      furosemide (LASIX) 20 MG tablet Take 20 mg by mouth daily      budesonide (PULMICORT) 0.5 MG/2ML nebulizer suspension Take 1 ampule by nebulization 2 times daily      Arformoterol Tartrate (BROVANA IN) Inhale into the lungs      tiotropium (SPIRIVA HANDIHALER) 18 MCG inhalation capsule Inhale 18 mcg into the lungs daily      MILK THISTLE PO Take 1,000 mg by mouth daily       aspirin 81 MG tablet Take 81 mg by mouth daily      OXYGEN Inhale  into the lungs as needed.  allopurinol (ZYLOPRIM) 300 MG tablet Take 100 mg by mouth daily.  atorvastatin (LIPITOR) 40 MG tablet Take 40 mg by mouth daily       fenofibrate (TRICOR) 145 MG tablet Take 145 mg by mouth daily.  lisinopril (PRINIVIL;ZESTRIL) 2.5 MG tablet Take 2.5 mg by mouth daily. No current facility-administered medications for this visit.        Lab Results:    CBC:   Lab Results   Component Value Date    WBC 7.1 02/23/2022    HGB 11.7 (L) 02/23/2022    HCT 36.3 (L) 02/23/2022    MCV 92.8 02/23/2022     02/23/2022     BMP:    Lab Results   Component Value Date     02/21/2022     02/08/2022     11/04/2021    K 4.6 02/21/2022    K 4.9 02/14/2022    K 5.9 (H) 02/08/2022    CL 96 (L) 02/21/2022    CL 98 (L) 02/08/2022     (L) 11/04/2021    CO2 32 02/21/2022    CO2 34 (H) 02/08/2022    CO2 33 (H) 11/04/2021    BUN 28 (H) 02/21/2022    BUN 53 (H) 02/08/2022    BUN 36 (H) 11/04/2021    CREATININE 1.8 (H) 02/23/2022    CREATININE 1.6 (H) 02/21/2022    CREATININE 2.13 (H) 02/08/2022    GLUCOSE 101 02/21/2022    GLUCOSE 86 02/08/2022    GLUCOSE 95 11/04/2021      Hepatic:   Lab Results   Component Value Date    AST 24 06/26/2019    AST 20 07/26/2018    ALT 23 06/26/2019    ALT 23 07/26/2018    BILITOT 0.6 06/26/2019    BILITOT 0.5 07/26/2018    ALKPHOS 48 06/26/2019    ALKPHOS 42 07/26/2018     BNP: No results found for: BNP  Lipids:   Lab Results   Component Value Date    CHOL 105 07/26/2018    HDL 35 07/26/2018     INR: No results found for: INR  URINE:   Lab Results   Component Value Date    PROTUR Negative 08/08/2017     Lab Results   Component Value Date    NITRU Negative 08/08/2017    COLORU Yellow 08/08/2017    WBCUA >100 08/08/2017    RBCUA 21-50 08/08/2017    MUCUS Present 12/03/2014    YEAST Present 06/30/2015    BACTERIA Trace 08/08/2017    LEUKOCYTESUR Large 08/08/2017    UROBILINOGEN <2.0 E.U./dL 08/08/2017    BILIRUBINUR Negative 08/08/2017    GLUCOSEU Negative 08/08/2017    KETUA Negative 08/08/2017    AMORPHOUS Present 12/03/2014      Microalbumen/Creatinine ratio:  No components found for: RUCREAT    Objective:   Vitals: BP (!) 104/54 (Site: Right Upper Arm, Position: Sitting, Cuff Size: Medium Adult)   Pulse 67   Temp 97.3 °F (36.3 °C)   Wt 160 lb (72.6 kg)   SpO2 98%   BMI 21.70 kg/m²      Constitutional:  Alert, awake, no apparent distress  Skin:normal with no rash or any significant lesions  HEENT:Pupils are reactive . Throat is clear. Oral mucosa is moist.  Neck:supple with no thyromegaly, JVD, lymphadenopathy or bruit **  Cardiovascular: Regular sinus rhythm without murmur, rubs or gallops   Respiratory:  Clear to auscultation with no wheezes or rales  Abdomen: Good bowel sound, soft, non tender and no bruit  Ext: No LE edema  Musculoskeletal:Intact  Neuro:Alert, awake and oriented with no obvious focal deficit. Speech is normal.    Electronically signed by Tiffany Moran MD on 3/7/2022 at 10:36 AM   **This report has been created using voice recognition software. It maycontain minor  errors which are inherent in voice recognition technology. **

## 2022-03-14 ENCOUNTER — TELEPHONE (OUTPATIENT)
Dept: NEPHROLOGY | Age: 82
End: 2022-03-14

## 2022-03-14 NOTE — TELEPHONE ENCOUNTER
Okay to get if it is going to help him. We just have to keep eye on the kidneys. He also need to talk to his liver doctor.

## 2022-03-14 NOTE — TELEPHONE ENCOUNTER
Eulalia Curling called today. He wants to know if it is okay with you if he takes. He has liver problems and kidney disease. He really don't think he should take these, but wants your opinion. Daliresp and sodium chloride nebulizer. His lung doctor prescribed these.

## 2022-03-16 NOTE — TELEPHONE ENCOUNTER
Patient called back. Stated he took them for three days, but started having urinary problems. He quit taking them because he was having urgency and hesitency. He does not have a liver doc, only [facility]. Stated he has lost 20 lbs or more in the last 6 months. After researching the s/e he has decided not to take them further.  He is going to notify the ordering

## 2022-06-07 ENCOUNTER — NURSE ONLY (OUTPATIENT)
Dept: LAB | Age: 82
End: 2022-06-07

## 2022-06-07 DIAGNOSIS — N18.32 STAGE 3B CHRONIC KIDNEY DISEASE (HCC): ICD-10-CM

## 2022-06-07 DIAGNOSIS — N17.9 AKI (ACUTE KIDNEY INJURY) (HCC): ICD-10-CM

## 2022-06-07 DIAGNOSIS — E55.9 VITAMIN D DEFICIENCY: ICD-10-CM

## 2022-06-07 LAB
ANION GAP SERPL CALCULATED.3IONS-SCNC: 9 MEQ/L (ref 8–16)
BUN BLDV-MCNC: 38 MG/DL (ref 7–22)
CALCIUM SERPL-MCNC: 10.3 MG/DL (ref 8.5–10.5)
CHLORIDE BLD-SCNC: 98 MEQ/L (ref 98–111)
CO2: 30 MEQ/L (ref 23–33)
CREAT SERPL-MCNC: 1.7 MG/DL (ref 0.4–1.2)
GFR SERPL CREATININE-BSD FRML MDRD: 39 ML/MIN/1.73M2
GLUCOSE BLD-MCNC: 95 MG/DL (ref 70–108)
POTASSIUM SERPL-SCNC: 4.8 MEQ/L (ref 3.5–5.2)
PTH INTACT: 41.9 PG/ML (ref 15–65)
SODIUM BLD-SCNC: 137 MEQ/L (ref 135–145)
VITAMIN D 25-HYDROXY: 55 NG/ML (ref 30–100)

## 2022-06-20 ENCOUNTER — OFFICE VISIT (OUTPATIENT)
Dept: NEPHROLOGY | Age: 82
End: 2022-06-20
Payer: MEDICARE

## 2022-06-20 VITALS
BODY MASS INDEX: 21.26 KG/M2 | TEMPERATURE: 97.7 F | HEIGHT: 72 IN | HEART RATE: 64 BPM | OXYGEN SATURATION: 99 % | WEIGHT: 157 LBS | DIASTOLIC BLOOD PRESSURE: 67 MMHG | SYSTOLIC BLOOD PRESSURE: 112 MMHG

## 2022-06-20 DIAGNOSIS — N18.32 STAGE 3B CHRONIC KIDNEY DISEASE (HCC): Primary | ICD-10-CM

## 2022-06-20 DIAGNOSIS — E83.52 HYPERCALCEMIA: ICD-10-CM

## 2022-06-20 DIAGNOSIS — E55.9 VITAMIN D DEFICIENCY: ICD-10-CM

## 2022-06-20 DIAGNOSIS — I10 ESSENTIAL HYPERTENSION: ICD-10-CM

## 2022-06-20 PROCEDURE — G8420 CALC BMI NORM PARAMETERS: HCPCS | Performed by: INTERNAL MEDICINE

## 2022-06-20 PROCEDURE — 1036F TOBACCO NON-USER: CPT | Performed by: INTERNAL MEDICINE

## 2022-06-20 PROCEDURE — 99213 OFFICE O/P EST LOW 20 MIN: CPT | Performed by: INTERNAL MEDICINE

## 2022-06-20 PROCEDURE — 1123F ACP DISCUSS/DSCN MKR DOCD: CPT | Performed by: INTERNAL MEDICINE

## 2022-06-20 PROCEDURE — G8427 DOCREV CUR MEDS BY ELIG CLIN: HCPCS | Performed by: INTERNAL MEDICINE

## 2022-06-20 NOTE — PROGRESS NOTES
Renal Progress Note    Assessment and Plan:      Diagnosis Orders   1. Stage 3b chronic kidney disease ([facility] [address]     2. Essential hypertension     3. Vitamin D deficiency     4. Hypercalcemia               PLAN:  1. Lab results reviewed with the patient and spouse  2. They understood  3. They had no questions  4. Serum creatinine is stable at 1.7 mg/dL  5. PTH is normal  6. Vitamin D level is normal  7. Medications reviewed  8. No changes  9. Return return in 6 months not 3 months with labs          Patient Active Problem List   Diagnosis    NAILA (acute kidney injury) ([facility] [address]    CKD (chronic kidney disease) stage 3, GFR 30-59 ml/min ([facility])    COPD (chronic obstructive pulmonary disease) ([facility] [address] [facility]    HTN (hypertension)    Anemia    Gout    Yeast UTI    Vitamin D deficiency    Hyperkalemia           Subjective:   Chief complaint:  Chief Complaint   Patient presents with    Other     naila      HPI:This is a follow up visit for Mr. Yue rodriguez who is here today for return appointment. I seen him for chronic kidney disease. Was last seen about 3 months ago. Doing well since then. .  No chest pain. He has chronic shortness of breath and wears oxygen for the most part. No nausea or vomiting. No fever or chills. No headaches. No difficulties with urination. ROS:  Pertinent positives stated above in HPI. All other systems were reviewed and were negative.   Medications:     Current Outpatient Medications   Medication Sig Dispense Refill    Coenzyme Q10 (COQ10) 200 MG CAPS Take by mouth      zinc gluconate 50 MG tablet Take 50 mg by mouth daily      furosemide (LASIX) 20 MG tablet Take 20 mg by mouth daily      budesonide (PULMICORT) 0.5 MG/2ML nebulizer suspension Take 1 ampule by nebulization 2 times daily      Arformoterol Tartrate (BROVANA IN) Inhale into the lungs      tiotropium (SPIRIVA HANDIHALER) 18 MCG inhalation capsule Inhale 18 mcg into the lungs daily      MILK THISTLE PO Take 1,000 mg by mouth daily       aspirin 81 MG tablet Take 81 mg by mouth daily      OXYGEN Inhale  into the lungs as needed.  allopurinol (ZYLOPRIM) 100 MG tablet Take 100 mg by mouth daily       atorvastatin (LIPITOR) 40 MG tablet Take 40 mg by mouth daily       fenofibrate (TRICOR) 145 MG tablet Take 145 mg by mouth daily.  lisinopril (PRINIVIL;ZESTRIL) 2.5 MG tablet Take 2.5 mg by mouth daily.  senna (SENOKOT) 8.6 MG TABS tablet Take 1 tablet by mouth as needed for Constipation (Patient not taking: Reported on 6/20/2022)       No current facility-administered medications for this visit.        Lab Results:    CBC:   Lab Results   Component Value Date    WBC 7.1 02/23/2022    HGB 11.7 (L) 02/23/2022    HCT 36.3 (L) 02/23/2022    MCV 92.8 02/23/2022     02/23/2022     BMP:    Lab Results   Component Value Date     06/07/2022     02/21/2022     02/08/2022    K 4.8 06/07/2022    K 4.6 02/21/2022    K 4.9 02/14/2022    CL 98 06/07/2022    CL 96 (L) 02/21/2022    CL 98 (L) 02/08/2022    CO2 30 06/07/2022    CO2 32 02/21/2022    CO2 34 (H) 02/08/2022    BUN 38 (H) 06/07/2022    BUN 28 (H) 02/21/2022    BUN 53 (H) 02/08/2022    CREATININE 1.7 (H) 06/07/2022    CREATININE 1.8 (H) 02/23/2022    CREATININE 1.6 (H) 02/21/2022    GLUCOSE 95 06/07/2022    GLUCOSE 101 02/21/2022    GLUCOSE 86 02/08/2022      Hepatic:   Lab Results   Component Value Date    AST 24 06/26/2019    AST 20 07/26/2018    ALT 23 06/26/2019    ALT 23 07/26/2018    BILITOT 0.6 06/26/2019    BILITOT 0.5 07/26/2018    ALKPHOS 48 06/26/2019    ALKPHOS 42 07/26/2018     BNP: No results found for: BNP  Lipids:   Lab Results   Component Value Date    CHOL 105 07/26/2018    HDL 35 07/26/2018     INR: No results found for: INR  URINE:   Lab Results   Component Value Date    PROTUR Negative 08/08/2017     Lab Results   Component Value Date    NITRU Negative 08/08/2017    COLORU Yellow 08/08/2017    WBCUA >100 08/08/2017    RBCUA 21-50

## 2022-09-02 ENCOUNTER — NURSE ONLY (OUTPATIENT)
Dept: LAB | Age: 82
End: 2022-09-02

## 2022-09-02 DIAGNOSIS — N39.0 URINARY TRACT INFECTION WITHOUT HEMATURIA, SITE UNSPECIFIED: ICD-10-CM

## 2022-09-02 DIAGNOSIS — N39.0 URINARY TRACT INFECTION WITHOUT HEMATURIA, SITE UNSPECIFIED: Primary | ICD-10-CM

## 2022-09-02 LAB
BACTERIA: ABNORMAL
BILIRUBIN URINE: NEGATIVE
BLOOD, URINE: NEGATIVE
CASTS: ABNORMAL /LPF
CASTS: ABNORMAL /LPF
CHARACTER, URINE: CLEAR
COLOR: YELLOW
CRYSTALS: ABNORMAL
EPITHELIAL CELLS, UA: ABNORMAL /HPF
GLUCOSE, URINE: NEGATIVE MG/DL
KETONES, URINE: NEGATIVE
LEUKOCYTE EST, POC: ABNORMAL
MISCELLANEOUS LAB TEST RESULT: ABNORMAL
NITRITE, URINE: NEGATIVE
PH UA: 7.5 (ref 5–9)
PROTEIN UA: NEGATIVE MG/DL
RBC URINE: ABNORMAL /HPF
RENAL EPITHELIAL, UA: ABNORMAL
SPECIFIC GRAVITY UA: 1.01 (ref 1–1.03)
UROBILINOGEN, URINE: 0.2 EU/DL (ref 0–1)
WBC UA: ABNORMAL /HPF
YEAST: ABNORMAL

## 2022-09-02 RX ORDER — CEPHALEXIN 500 MG/1
500 CAPSULE ORAL 3 TIMES DAILY
Qty: 30 CAPSULE | Refills: 0 | Status: SHIPPED | OUTPATIENT
Start: 2022-09-02 | End: 2022-09-12

## 2022-09-02 NOTE — TELEPHONE ENCOUNTER
I called and informed pt of this information. He will go to [facility]. He wants the antibiotic sent to [facility].

## 2022-09-02 NOTE — TELEPHONE ENCOUNTER
Patient called into the office. Complaints of Burning with urination, hesitancy and frequency. Do you want to check a UA? Please advise.

## 2022-12-12 ENCOUNTER — NURSE ONLY (OUTPATIENT)
Dept: LAB | Age: 82
End: 2022-12-12

## 2022-12-12 DIAGNOSIS — E55.9 VITAMIN D DEFICIENCY: ICD-10-CM

## 2022-12-12 DIAGNOSIS — N18.32 STAGE 3B CHRONIC KIDNEY DISEASE (HCC): ICD-10-CM

## 2022-12-12 LAB
ANION GAP SERPL CALCULATED.3IONS-SCNC: 10 MEQ/L (ref 8–16)
BUN BLDV-MCNC: 25 MG/DL (ref 7–22)
CALCIUM SERPL-MCNC: 10.7 MG/DL (ref 8.5–10.5)
CHLORIDE BLD-SCNC: 97 MEQ/L (ref 98–111)
CO2: 31 MEQ/L (ref 23–33)
CREAT SERPL-MCNC: 1.4 MG/DL (ref 0.4–1.2)
GFR SERPL CREATININE-BSD FRML MDRD: 50 ML/MIN/1.73M2
GLUCOSE BLD-MCNC: 93 MG/DL (ref 70–108)
POTASSIUM SERPL-SCNC: 4.7 MEQ/L (ref 3.5–5.2)
PTH INTACT: 36.9 PG/ML (ref 15–65)
SODIUM BLD-SCNC: 138 MEQ/L (ref 135–145)
VITAMIN D 25-HYDROXY: 39 NG/ML (ref 30–100)

## 2022-12-19 ENCOUNTER — OFFICE VISIT (OUTPATIENT)
Dept: NEPHROLOGY | Age: 82
End: 2022-12-19
Payer: MEDICARE

## 2022-12-19 VITALS
WEIGHT: 163.6 LBS | HEART RATE: 68 BPM | BODY MASS INDEX: 22.19 KG/M2 | OXYGEN SATURATION: 96 % | DIASTOLIC BLOOD PRESSURE: 67 MMHG | SYSTOLIC BLOOD PRESSURE: 136 MMHG

## 2022-12-19 DIAGNOSIS — N18.31 STAGE 3A CHRONIC KIDNEY DISEASE (HCC): Primary | ICD-10-CM

## 2022-12-19 DIAGNOSIS — I10 PRIMARY HYPERTENSION: ICD-10-CM

## 2022-12-19 PROCEDURE — 99213 OFFICE O/P EST LOW 20 MIN: CPT | Performed by: INTERNAL MEDICINE

## 2022-12-19 PROCEDURE — G8420 CALC BMI NORM PARAMETERS: HCPCS | Performed by: INTERNAL MEDICINE

## 2022-12-19 PROCEDURE — 1123F ACP DISCUSS/DSCN MKR DOCD: CPT | Performed by: INTERNAL MEDICINE

## 2022-12-19 PROCEDURE — G8427 DOCREV CUR MEDS BY ELIG CLIN: HCPCS | Performed by: INTERNAL MEDICINE

## 2022-12-19 PROCEDURE — 3078F DIAST BP <80 MM HG: CPT | Performed by: INTERNAL MEDICINE

## 2022-12-19 PROCEDURE — 1036F TOBACCO NON-USER: CPT | Performed by: INTERNAL MEDICINE

## 2022-12-19 PROCEDURE — 3074F SYST BP LT 130 MM HG: CPT | Performed by: INTERNAL MEDICINE

## 2022-12-19 PROCEDURE — G8484 FLU IMMUNIZE NO ADMIN: HCPCS | Performed by: INTERNAL MEDICINE

## 2022-12-19 NOTE — PROGRESS NOTES
Renal Progress Note    Assessment and Plan:      Diagnosis Orders   1. Stage 3a chronic kidney disease ([facility] [address]        2. Primary hypertension                  PLAN:  Lab results are reviewed with the patient. He understood. Serum creatinine is improved and stable at 1.4 mg/dL. Serum potassium level is back to normal.  Medications reviewed  No changes  Return visit in 6 months and 12 months thereafter if no changes. .          Patient Active Problem List   Diagnosis    NAILA (acute kidney injury) ([facility] [address]    CKD (chronic kidney disease) stage 3, GFR 30-59 ml/min ([facility]    COPD (chronic obstructive pulmonary disease) ([facility])    HTN (hypertension)    Anemia    Gout    Yeast UTI    Vitamin D deficiency    Hyperkalemia           Subjective:   Chief complaint:  Chief Complaint   Patient presents with    Chronic Kidney Disease     Stage IIIa      HPI:This is a follow up visit for Mr. Denman Duane who is here today for return appointment. I seen him for chronic kidney disease. He was last seen about 6 months ago. He is doing well with no complaints. No chest pain. No shortness of breath. Appetite is good. No difficulties urination. ROS:  Pertinent positives stated above in HPI. All other systems were reviewed and were negative.   Medications:     Current Outpatient Medications   Medication Sig Dispense Refill    Coenzyme Q10 (COQ10) 200 MG CAPS Take by mouth      zinc gluconate 50 MG tablet Take 50 mg by mouth daily      senna (SENOKOT) 8.6 MG TABS tablet Take 1 tablet by mouth as needed for Constipation      furosemide (LASIX) 20 MG tablet Take 20 mg by mouth daily      budesonide (PULMICORT) 0.5 MG/2ML nebulizer suspension Take 1 ampule by nebulization 2 times daily      Arformoterol Tartrate (BROVANA IN) Inhale into the lungs      tiotropium (SPIRIVA) 18 MCG inhalation capsule Inhale 18 mcg into the lungs daily      MILK THISTLE PO Take 1,000 mg by mouth daily       aspirin 81 MG tablet Take 81 mg by mouth daily OXYGEN Inhale  into the lungs as needed. allopurinol (ZYLOPRIM) 100 MG tablet Take 100 mg by mouth daily       atorvastatin (LIPITOR) 40 MG tablet Take 40 mg by mouth daily       fenofibrate (TRICOR) 145 MG tablet Take 145 mg by mouth daily. lisinopril (PRINIVIL;ZESTRIL) 2.5 MG tablet Take 2.5 mg by mouth daily. No current facility-administered medications for this visit.        Lab Results:    CBC:   Lab Results   Component Value Date    WBC 7.1 02/23/2022    HGB 11.7 (L) 02/23/2022    HCT 36.3 (L) 02/23/2022    MCV 92.8 02/23/2022     02/23/2022     BMP:    Lab Results   Component Value Date     12/12/2022     07/15/2022     06/07/2022    K 4.7 12/12/2022    K 6.1 (HH) 07/15/2022    K 4.8 06/07/2022    CL 97 (L) 12/12/2022    CL 96 (L) 07/15/2022    CL 98 06/07/2022    CO2 31 12/12/2022    CO2 34 (H) 07/15/2022    CO2 30 06/07/2022    BUN 25 (H) 12/12/2022    BUN 40 (H) 07/15/2022    BUN 38 (H) 06/07/2022    CREATININE 1.4 (H) 12/12/2022    CREATININE 1.4 (H) 07/15/2022    CREATININE 1.7 (H) 06/07/2022    GLUCOSE 93 12/12/2022    GLUCOSE 119 (H) 07/15/2022    GLUCOSE 95 06/07/2022      Hepatic:   Lab Results   Component Value Date    AST 24 06/26/2019    AST 20 07/26/2018    ALT 23 06/26/2019    ALT 23 07/26/2018    BILITOT 0.6 06/26/2019    BILITOT 0.5 07/26/2018    ALKPHOS 48 06/26/2019    ALKPHOS 42 07/26/2018     BNP: No results found for: BNP  Lipids:   Lab Results   Component Value Date    CHOL 105 07/26/2018    HDL 35 07/26/2018     INR: No results found for: INR  URINE:   Lab Results   Component Value Date/Time    PROTUR Negative 08/08/2017 09:27 AM     Lab Results   Component Value Date/Time    NITRU NEGATIVE 09/02/2022 02:30 PM    COLORU YELLOW 09/02/2022 02:30 PM    PHUR 7.5 09/02/2022 02:30 PM    LABCAST >15 HYALINE 09/02/2022 02:30 PM    LABCAST NONE SEEN 09/02/2022 02:30 PM    WBCUA 25-50 09/02/2022 02:30 PM    WBCUA >100 08/08/2017 09:27 AM    RBCUA 0-2 09/02/2022 02:30 PM    MUCUS Present 12/03/2014 02:00 PM    YEAST NONE SEEN 09/02/2022 02:30 PM    BACTERIA FEW 09/02/2022 02:30 PM    SPECGRAV 1.008 09/02/2022 02:30 PM    LEUKOCYTESUR LARGE 09/02/2022 02:30 PM    UROBILINOGEN 0.2 09/02/2022 02:30 PM    BILIRUBINUR NEGATIVE 09/02/2022 02:30 PM    BLOODU NEGATIVE 09/02/2022 02:30 PM    GLUCOSEU Negative 08/08/2017 09:27 AM    KETUA NEGATIVE 09/02/2022 02:30 PM    AMORPHOUS Present 12/03/2014 02:00 PM      Microalbumen/Creatinine ratio:  No components found for: RUCREAT    Objective:   Vitals: /67 (Site: Left Upper Arm, Position: Sitting, Cuff Size: Medium Adult)   Pulse 68   Wt 163 lb 9.6 oz (74.2 kg)   SpO2 96%   BMI 22.19 kg/m²      Constitutional:  Alert, awake, no apparent distress  Skin:normal with no rash or any significant lesions  HEENT:Pupils are reactive . Throat is clear. Oral mucosa is moist.  Neck:supple with no thyromegaly, JVD, lymphadenopathy or bruit   Cardiovascular: Regular sinus rhythm without murmur, rubs or gallops   Respiratory:  Clear to auscultation with no wheezes or rales  Abdomen: Good bowel sound, soft, non tender and no bruit  Ext: No LE edema  Musculoskeletal:Intact  Neuro:Alert, awake and oriented with no obvious focal deficit. Speech is normal.    Electronically signed by Elisabeth Garcia MD on 12/19/2022 at 11:11 AM   **This report has been created using voice recognition software. It maycontain minor  errors which are inherent in voice recognition technology. **

## 2023-01-01 NOTE — PROGRESS NOTES
Value Date    CHOL 105 07/26/2018    HDL 35 07/26/2018     INR: No results found for: INR  URINE:   Lab Results   Component Value Date    PROTUR Negative 08/08/2017     Lab Results   Component Value Date    NITRU Negative 08/08/2017    COLORU Yellow 08/08/2017    WBCUA >100 08/08/2017    RBCUA 21-50 08/08/2017    MUCUS Present 12/03/2014    YEAST Present 06/30/2015    BACTERIA Trace 08/08/2017    LEUKOCYTESUR Large 08/08/2017    UROBILINOGEN <2.0 E.U./dL 08/08/2017    BILIRUBINUR Negative 08/08/2017    GLUCOSEU Negative 08/08/2017    KETUA Negative 08/08/2017    AMORPHOUS Present 12/03/2014      Microalbumen/Creatinine ratio:  No components found for: RUCREAT    Objective:   Vitals: BP (!) 142/75 (Site: Right Upper Arm, Position: Sitting, Cuff Size: Large Adult)   Pulse 60   Wt 188 lb 3.2 oz (85.4 kg)   SpO2 96%   BMI 25.52 kg/m²      Constitutional:  Alert, awake, no apparent distress  Skin:normal  HEENT:Pupils are reactive . Throat is clear  Neck:supple with no thyromegally  Cardiovascular:  S1, S2 without murmur  Respiratory:  Clear  Abdomen: +bs, soft, non tender  Ext: No LE edema  Musculoskeletal:Intact  Neuro:Alert and oriented with no deficit    Electronically signed by Cortney Galdamez MD on 7/30/2019 at 10:40 AM
negative

## 2023-02-28 ENCOUNTER — TELEPHONE (OUTPATIENT)
Dept: NEPHROLOGY | Age: 83
End: 2023-02-28

## 2023-02-28 NOTE — TELEPHONE ENCOUNTER
Pt phoned thinks he may have a uti this started on Sunday - has been urinating every 20 to 30 minutes - no burning - but kelli sandoval cnp put him on zithromax 250 mg qd has been on this for 1 month for a sinus infection.

## 2023-04-24 ENCOUNTER — TELEPHONE (OUTPATIENT)
Dept: NEPHROLOGY | Age: 83
End: 2023-04-24

## 2023-04-24 NOTE — TELEPHONE ENCOUNTER
Is it okay for pt to have CT neck w contrast and Ct chest with contrast? Last creat 4/20/23 1.38 and BUN 27.  Please advise

## 2023-04-25 NOTE — TELEPHONE ENCOUNTER
I spoke to Hugo Allen about the information below. He said to remind them that he is claustrophobic.

## 2023-04-25 NOTE — TELEPHONE ENCOUNTER
Okay for the procedure.   Patient must drink 1 bottle of Water before and Another 1 Bottle Immediately after the Procedure

## 2023-04-25 NOTE — TELEPHONE ENCOUNTER
I spoke to Alka about the information below. She said if Paz Freed needs something to calm him down Dr. Elena Valerio will do so.

## 2023-06-21 RX ORDER — CEPHALEXIN 500 MG/1
500 CAPSULE ORAL 2 TIMES DAILY
Qty: 14 CAPSULE | Refills: 0 | Status: SHIPPED | OUTPATIENT
Start: 2023-06-21 | End: 2023-06-28

## 2023-06-21 NOTE — TELEPHONE ENCOUNTER
Patient is having burning with urination, frequency and pain in the groin area.  Would you like a urine reflex to culture

## 2023-06-22 ENCOUNTER — TELEPHONE (OUTPATIENT)
Dept: NEPHROLOGY | Age: 83
End: 2023-06-22

## 2023-06-22 RX ORDER — CEPHALEXIN 500 MG/1
500 CAPSULE ORAL 2 TIMES DAILY
Qty: 14 CAPSULE | Refills: 0 | Status: SHIPPED | OUTPATIENT
Start: 2023-06-22 | End: 2023-06-29

## 2023-06-22 NOTE — TELEPHONE ENCOUNTER
Patient called stating we prescribed cephalexin for him and he was wondering if he should still take the azithromycin 250 mg daily that his lung doctor (Dr. Brenda Hamm) prescribed for him which he says he has been taking for approximately the last 3 months.

## 2023-07-14 ENCOUNTER — APPOINTMENT (OUTPATIENT)
Dept: GENERAL RADIOLOGY | Age: 83
End: 2023-07-14
Attending: EMERGENCY MEDICINE
Payer: OTHER GOVERNMENT

## 2023-07-14 ENCOUNTER — HOSPITAL ENCOUNTER (EMERGENCY)
Age: 83
Discharge: HOME | End: 2023-07-14
Attending: EMERGENCY MEDICINE
Payer: OTHER GOVERNMENT

## 2023-07-14 ENCOUNTER — APPOINTMENT (OUTPATIENT)
Dept: CT IMAGING | Age: 83
End: 2023-07-14
Attending: EMERGENCY MEDICINE
Payer: OTHER GOVERNMENT

## 2023-07-14 VITALS
SYSTOLIC BLOOD PRESSURE: 118 MMHG | DIASTOLIC BLOOD PRESSURE: 87 MMHG | TEMPERATURE: 98.3 F | RESPIRATION RATE: 16 BRPM | HEART RATE: 79 BPM | OXYGEN SATURATION: 100 %

## 2023-07-14 DIAGNOSIS — R53.81 PHYSICAL DECONDITIONING: ICD-10-CM

## 2023-07-14 DIAGNOSIS — E86.0 DEHYDRATION: ICD-10-CM

## 2023-07-14 DIAGNOSIS — L03.119 CELLULITIS AND ABSCESS OF LEG: ICD-10-CM
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
## 2023-07-14 DIAGNOSIS — L02.419 CELLULITIS AND ABSCESS OF LEG: ICD-10-CM

## 2023-07-14 LAB
ALBUMIN SERPL BCP-MCNC: 3.2 GM/DL (ref 3.4–5)
ALP SERPL-CCNC: 69 U/L (ref 46–116)
ALT SERPL W P-5'-P-CCNC: 24 U/L (ref 14–63)
ANION GAP SERPL CALC-SCNC: 3 MEQ/L (ref 8–16)
AST SERPL W P-5'-P-CCNC: 28 U/L (ref 15–37)
BASOPHILS # BLD: 0.4 % (ref 0–3)
BASOPHILS ABSOLUTE: 0 THOU/MM3 (ref 0–0.1)
BILIRUB SERPL-MCNC: 0.5 MG/DL (ref 0.2–1)
BUN SERPL-MCNC: 25 MG/DL (ref 7–18)
CALCIUM SERPL-MCNC: 10.3 MG/DL (ref 8.5–10.1)
CHLORIDE SERPL-SCNC: 98 MEQ/L (ref 98–107)
CO2 SERPL-SCNC: 35 MEQ/L (ref 21–32)
CREAT SERPL-MCNC: 1.7 MG/DL (ref 0.6–1.3)
EOSINOPHILS ABSOLUTE: 0.1 THOU/MM3 (ref 0–0.5)
EOSINOPHILS RELATIVE PERCENT: 1 % (ref 0–4)
GFR SERPL CREATININE-BSD FRML MDRD: 40 ML/MIN/1.73M2
GLUCOSE SERPL-MCNC: 110 MG/DL (ref 74–106)
HCT VFR BLD CALC: 33 % (ref 42–52)
HEMOGLOBIN: 10.8 GM/DL (ref 14–18)
IMMATURE GRANS (ABS): 0.02 THOU/MM3 (ref 0–0.07)
IMMATURE GRANULOCYTES: 0 %
LYMPHOCYTES # BLD AUTO: 10.5 % (ref 15–47)
LYMPHOCYTES ABSOLUTE: 1 THOU/MM3 (ref 1–4.8)
MAGNESIUM SERPL-MCNC: 1.6 MG/DL (ref 1.8–2.4)
MCH RBC QN AUTO: 31.1 PG (ref 26–32)
MCHC RBC AUTO-ENTMCNC: 32.7 GM/DL (ref 31–35)
MCV RBC AUTO: 95.1 FL (ref 80–94)
MONOCYTES: 0.7 THOU/MM3 (ref 0.3–1.3)
MONOCYTES: 7.6 % (ref 0–12)
NT PRO BNP: 681 PG/ML (ref 0–1800)
PDW BLD-RTO: 14 % (ref 11.5–14.9)
PLATELET # BLD AUTO: 197 THOU/MM3 (ref 130–400)
PMV BLD AUTO: 10 FL (ref 9.4–12.4)
POTASSIUM SERPL-SCNC: 4.1 MEQ/L (ref 3.5–5.1)
PROT SERPL-MCNC: 7 GM/DL (ref 6.4–8.2)
RBC # BLD: 3.47 MILL/MM3 (ref 4.5–6.1)
SEG NEUTROPHILS: 80.3 % (ref 43–75)
SEGMENTED NEUTROPHILS ABSOLUTE COUNT: 7.3 THOU/MM3 (ref 1.8–7.7)
SODIUM SERPL-SCNC: 136 MEQ/L (ref 136–145)
TROPONIN, HIGH SENSITIVITY: 12.7 PG/ML (ref 0–76.1)
WBC # BLD: 9.1 THOU/MM3 (ref 4.8–10.8)

## 2023-07-14 PROCEDURE — 83880 ASSAY OF NATRIURETIC PEPTIDE: CPT

## 2023-07-14 PROCEDURE — 84484 ASSAY OF TROPONIN QUANT: CPT

## 2023-07-14 PROCEDURE — 72125 CT NECK SPINE W/O DYE: CPT

## 2023-07-14 PROCEDURE — 85025 COMPLETE CBC W/AUTO DIFF WBC: CPT

## 2023-07-14 PROCEDURE — 99285 EMERGENCY DEPT VISIT HI MDM: CPT

## 2023-07-14 PROCEDURE — 93005 ELECTROCARDIOGRAM TRACING: CPT | Performed by: EMERGENCY MEDICINE

## 2023-07-14 PROCEDURE — 80053 COMPREHEN METABOLIC PANEL: CPT

## 2023-07-14 PROCEDURE — 73502 X-RAY EXAM HIP UNI 2-3 VIEWS: CPT

## 2023-07-14 PROCEDURE — 2580000003 HC RX 258: Performed by: EMERGENCY MEDICINE

## 2023-07-14 PROCEDURE — 73590 X-RAY EXAM OF LOWER LEG: CPT

## 2023-07-14 PROCEDURE — 83735 ASSAY OF MAGNESIUM: CPT

## 2023-07-14 PROCEDURE — 70450 CT HEAD/BRAIN W/O DYE: CPT

## 2023-07-14 PROCEDURE — 71046 X-RAY EXAM CHEST 2 VIEWS: CPT

## 2023-07-14 RX ORDER — 0.9 % SODIUM CHLORIDE 0.9 %
500 INTRAVENOUS SOLUTION INTRAVENOUS ONCE
Status: COMPLETED | OUTPATIENT
Start: 2023-07-14 | End: 2023-07-14

## 2023-07-14 RX ORDER — CEPHALEXIN 500 MG/1
500 CAPSULE ORAL 4 TIMES DAILY
Qty: 40 CAPSULE | Refills: 0 | Status: SHIPPED | OUTPATIENT
Start: 2023-07-14 | End: 2023-07-24

## 2023-07-14 RX ADMIN — SODIUM CHLORIDE 500 ML: 9 INJECTION, SOLUTION INTRAVENOUS at 19:45

## 2023-07-14 ASSESSMENT — ENCOUNTER SYMPTOMS
VOMITING: 0
EYE PAIN: 0
TROUBLE SWALLOWING: 0
STRIDOR: 0
SORE THROAT: 0
SHORTNESS OF BREATH: 0
CONSTIPATION: 0
EYE DISCHARGE: 0
EYE REDNESS: 0
COLOR CHANGE: 0
DIARRHEA: 0
COUGH: 0
ABDOMINAL DISTENTION: 0
PHOTOPHOBIA: 0
NAUSEA: 0
APNEA: 0
EYE ITCHING: 0
VOICE CHANGE: 0
WHEEZING: 0
CHEST TIGHTNESS: 0
ABDOMINAL PAIN: 0
CHOKING: 0
BACK PAIN: 0
SINUS PRESSURE: 0
RHINORRHEA: 0
BLOOD IN STOOL: 0

## 2023-07-14 ASSESSMENT — PAIN SCALES - GENERAL: PAINLEVEL_OUTOF10: 6

## 2023-07-14 ASSESSMENT — PAIN DESCRIPTION - LOCATION: LOCATION: COCCYX

## 2023-07-14 NOTE — DISCHARGE INSTRUCTIONS
Patient had a fall most likely secondary due to to physical deconditioning. Patient has minor dehydration. He also has what appears to be the beginnings of cellulitis on his lower extremities. Patient is instructed to stay well-hydrated drinking plenty of water. He is instructed to watch what kind of physical activity he is doing and to use his walker judiciously. Patient is instructed to follow-up with a primary care physician and do so within the next 1 to 2 days. He is instructed to use Tylenol and Motrin for any pain. He is instructed to return to the emergency room immediately for any new or worsening complaints.

## 2023-07-14 NOTE — ED NOTES
Patient presents today after fall that happened about 3 hours ago, he reports \"getting twisted up\" and fall on left side. Complains of left hip pain but has no tenderness with palpation. Complains of coccyx pain that better or worse depending on position. He reports, \"I did hit the back of my head too. \"  Placed in gown, pants/socks removed. Bruising and swelling noted to top of left foot. Patient wears 2L of oxgyen via NC at home, placed on that.       Welford Barthel, RN  07/14/23 [id number]

## 2023-07-14 NOTE — ED PROVIDER NOTES
[facility]  eMERGENCY dEPARTMENT eNCOUnter          CHIEF COMPLAINT       Chief Complaint   Patient presents with    Fall     Pain left hip, tailbone       Nurses Notes reviewed and I agree except as noted in the HPI. HISTORY OF PRESENT ILLNESS    Melissa Coreas is a 81 y.o. male who presents for left leg weakness. Patient does have a history of strong back problems. Patient also has a history of atrial fibrillation and cardiac issues. Patient denies any chest pain shortness of breath. He had no syncopal or presyncopal episodes. Patient states that he got up and started walking and his left leg just gave out on him. He is complaining about hip and lower leg pain. Patient does have diffuse swelling of the lower extremities bilaterally this is not new in nature. He also has some erythema of the bilateral lower extremities on the right side greater than on the left. Patient does have pain in the knee and hip on the right with mobilization. However the patient is able to stand and pivot just fine. Patient is complaining about his leg going out on him. He is unclear whether it is pain that causes it to go out or it just feels weak. Patient is otherwise resting comfortably on the cot no apparent distress no other physical complaints at this time. REVIEW OF SYSTEMS     Review of Systems   Constitutional:  Negative for activity change, appetite change, diaphoresis, fatigue and unexpected weight change. HENT:  Negative for congestion, ear discharge, ear pain, hearing loss, rhinorrhea, sinus pressure, sore throat, trouble swallowing and voice change. Eyes:  Negative for photophobia, pain, discharge, redness and itching. Respiratory:  Negative for apnea, cough, choking, chest tightness, shortness of breath, wheezing and stridor. Cardiovascular:  Positive for leg swelling (Chronic). Negative for chest pain and palpitations.    Gastrointestinal:  Negative for abdominal distention, fracture identified. 2. 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 XR CHEST (2 VW)   Final Result   Impression:   1. No acute rib fracture or pneumothorax. 2. Hyperinflation which may be reflective of COPD. This document has been electronically signed by: Dave Mcclain MD on    07/14/2023 07:09 PM      XR HIP 2-3 VW W PELVIS LEFT   Final Result   Impression:   1. No acute pelvic or hip fracture. If there is concern for occult fracture, then CT may be considered. This document has been electronically signed by: Dave Mcclain MD on    07/14/2023 07:06 PM      XR TIBIA FIBULA LEFT (2 VIEWS)   Final Result   Impression:   1. No acute fracture of the tibia or fibula. If there is concern for occult fracture, then delayed radiographs in 7-10    days may be considered.       This document has been electronically signed by: Dave Mcclain MD on    07/14/2023 07:08 PM            LABS:   Labs Reviewed   CBC WITH AUTO DIFFERENTIAL - Abnormal; Notable for the following components:       Result Value    RBC 3.47 (*)     Hemoglobin 10.8 (*)     Hematocrit 33.0 (*)     MCV 95.1 (*)     Seg Neutrophils 80.3 (*)     Lymphocytes 10.5 (*)     All other components within normal limits   COMPREHENSIVE METABOLIC PANEL - Abnormal; Notable for the following components:    Glucose 110 (*)     Creatinine 1.7 (*)     BUN 25 (*)     CO2 35 (*)     POC CALCIUM 10.3 (*)     Albumin 3.2 (*)     All other components within normal limits   MAGNESIUM - Abnormal; Notable for the following components:    Magnesium 1.6 (*)     All other components within normal limits   GLOMERULAR FILTRATION RATE, ESTIMATED -

## 2023-07-15 LAB
EKG Q-T INTERVAL: 394 MS
EKG QRS DURATION: 120 MS
EKG QTC CALCULATION (BAZETT): 413 MS
EKG R AXIS: 68 DEGREES
EKG T AXIS: 51 DEGREES
EKG VENTRICULAR RATE: 66 BPM

## 2023-07-15 PROCEDURE — 93010 ELECTROCARDIOGRAM REPORT: CPT | Performed by: INTERNAL MEDICINE

## 2023-07-27 ENCOUNTER — OFFICE VISIT (OUTPATIENT)
Dept: NEPHROLOGY | Age: 83
End: 2023-07-27
Payer: MEDICARE

## 2023-07-27 VITALS
OXYGEN SATURATION: 96 % | SYSTOLIC BLOOD PRESSURE: 111 MMHG | HEART RATE: 61 BPM | BODY MASS INDEX: 21.94 KG/M2 | HEIGHT: 72 IN | WEIGHT: 162 LBS | DIASTOLIC BLOOD PRESSURE: 65 MMHG

## 2023-07-27 DIAGNOSIS — E78.5 DYSLIPIDEMIA: ICD-10-CM

## 2023-07-27 DIAGNOSIS — R80.9 PROTEINURIA, UNSPECIFIED TYPE: ICD-10-CM

## 2023-07-27 DIAGNOSIS — Z90.5 SOLITARY KIDNEY, ACQUIRED: ICD-10-CM

## 2023-07-27 DIAGNOSIS — E83.42 HYPOMAGNESEMIA: ICD-10-CM

## 2023-07-27 DIAGNOSIS — N18.32 STAGE 3B CHRONIC KIDNEY DISEASE (HCC): Primary | ICD-10-CM

## 2023-07-27 DIAGNOSIS — I10 PRIMARY HYPERTENSION: ICD-10-CM

## 2023-07-27 PROCEDURE — 3074F SYST BP LT 130 MM HG: CPT | Performed by: INTERNAL MEDICINE

## 2023-07-27 PROCEDURE — 99214 OFFICE O/P EST MOD 30 MIN: CPT | Performed by: INTERNAL MEDICINE

## 2023-07-27 PROCEDURE — 3078F DIAST BP <80 MM HG: CPT | Performed by: INTERNAL MEDICINE

## 2023-07-27 PROCEDURE — G8427 DOCREV CUR MEDS BY ELIG CLIN: HCPCS | Performed by: INTERNAL MEDICINE

## 2023-07-27 PROCEDURE — G8420 CALC BMI NORM PARAMETERS: HCPCS | Performed by: INTERNAL MEDICINE

## 2023-07-27 PROCEDURE — 1036F TOBACCO NON-USER: CPT | Performed by: INTERNAL MEDICINE

## 2023-07-27 PROCEDURE — 1123F ACP DISCUSS/DSCN MKR DOCD: CPT | Performed by: INTERNAL MEDICINE

## 2023-07-27 RX ORDER — MAGNESIUM OXIDE 400 MG/1
400 TABLET ORAL DAILY
Qty: 30 TABLET | Refills: 1 | Status: SHIPPED | OUTPATIENT
Start: 2023-07-27

## 2023-07-27 NOTE — PROGRESS NOTES
Been on a zpak for about 3 months and then he will be done.
negative. Medications:     Current Outpatient Medications   Medication Sig Dispense Refill    Coenzyme Q10 (COQ10) 200 MG CAPS Take by mouth      zinc gluconate 50 MG tablet Take 1 tablet by mouth daily      senna (SENOKOT) 8.6 MG TABS tablet Take 1 tablet by mouth as needed for Constipation      furosemide (LASIX) 20 MG tablet Take 1 tablet by mouth daily      budesonide (PULMICORT) 0.5 MG/2ML nebulizer suspension Take 2 mLs by nebulization 2 times daily      Arformoterol Tartrate (BROVANA IN) Inhale into the lungs      tiotropium (SPIRIVA) 18 MCG inhalation capsule Inhale 1 capsule into the lungs daily      MILK THISTLE PO Take 1,000 mg by mouth daily       aspirin 81 MG tablet Take 1 tablet by mouth daily      OXYGEN Inhale  into the lungs as needed. allopurinol (ZYLOPRIM) 100 MG tablet Take 1 tablet by mouth daily      fenofibrate (TRICOR) 145 MG tablet Take 1 tablet by mouth daily      lisinopril (PRINIVIL;ZESTRIL) 2.5 MG tablet Take 1 tablet by mouth daily      atorvastatin (LIPITOR) 40 MG tablet Take 40 mg by mouth daily  (Patient not taking: Reported on 7/27/2023)       No current facility-administered medications for this visit.        Lab Results:    CBC:   Lab Results   Component Value Date    WBC 9.1 07/14/2023    HGB 10.8 (L) 07/14/2023    HCT 33.0 (L) 07/14/2023    MCV 95.1 (H) 07/14/2023     07/14/2023     BMP:    Lab Results   Component Value Date     07/14/2023     06/12/2023     12/12/2022    K 4.1 07/14/2023    K 4.2 06/12/2023    K 4.7 12/12/2022    CL 98 07/14/2023    CL 97 (L) 06/12/2023    CL 97 (L) 12/12/2022    CO2 35 (H) 07/14/2023    CO2 29 06/12/2023    CO2 31 12/12/2022    BUN 25 (H) 07/14/2023    BUN 25 (H) 06/12/2023    BUN 25 (H) 12/12/2022    CREATININE 1.7 (H) 07/14/2023    CREATININE 1.5 (H) 06/12/2023    CREATININE 1.4 (H) 12/12/2022    GLUCOSE 110 (H) 07/14/2023    GLUCOSE 89 06/12/2023    GLUCOSE 93 12/12/2022      Hepatic:   Lab Results   Component

## 2023-07-27 NOTE — PATIENT INSTRUCTIONS
Please increase the Lasix is also known as furosemide from 20 mg a day to 40 mg a day for 5 days only. Thereafter, go back to 20 mg once a day.

## 2023-08-08 ENCOUNTER — HOSPITAL ENCOUNTER (OUTPATIENT)
Dept: GENERAL RADIOLOGY | Age: 83
Discharge: HOME | End: 2023-08-08
Payer: MEDICARE

## 2023-08-08 ENCOUNTER — HOSPITAL ENCOUNTER (OUTPATIENT)
Age: 83
Discharge: HOME | End: 2023-08-08
Payer: MEDICARE

## 2023-08-08 DIAGNOSIS — M79.672 LEFT FOOT PAIN: ICD-10-CM

## 2023-08-08 PROCEDURE — 73630 X-RAY EXAM OF FOOT: CPT

## 2023-10-26 ENCOUNTER — HOSPITAL ENCOUNTER (OUTPATIENT)
Age: 83
Discharge: HOME | End: 2023-10-26
Payer: MEDICARE

## 2023-10-26 DIAGNOSIS — N18.32 STAGE 3B CHRONIC KIDNEY DISEASE (HCC): ICD-10-CM

## 2023-10-26 DIAGNOSIS — E83.42 HYPOMAGNESEMIA: ICD-10-CM

## 2023-10-26 DIAGNOSIS — R80.9 PROTEINURIA, UNSPECIFIED TYPE: ICD-10-CM

## 2023-10-26 LAB
25(OH)D3 SERPL-MCNC: 35 NG/ML (ref 30–100)
ANION GAP SERPL CALC-SCNC: 8 MEQ/L (ref 8–16)
BUN SERPL-MCNC: 35 MG/DL (ref 7–22)
CALCIUM SERPL-MCNC: 10.5 MG/DL (ref 8.5–10.5)
CHLORIDE SERPL-SCNC: 96 MEQ/L (ref 98–111)
CO2 SERPL-SCNC: 33 MEQ/L (ref 23–33)
CREAT SERPL-MCNC: 1.4 MG/DL (ref 0.4–1.2)
CREAT UR-MCNC: 38.3 MG/DL
GFR SERPL CREATININE-BSD FRML MDRD: 50 ML/MIN/1.73M2
GLUCOSE SERPL-MCNC: 87 MG/DL (ref 70–108)
MAGNESIUM SERPL-MCNC: 1.9 MG/DL (ref 1.6–2.4)
POTASSIUM SERPL-SCNC: 4.8 MEQ/L (ref 3.5–5.2)
PROT UR-MCNC: 11.9 MG/DL
PROT/CREAT 24H UR: 0.31 MG/G{CREAT}
SODIUM SERPL-SCNC: 137 MEQ/L (ref 135–145)

## 2023-10-26 PROCEDURE — 82570 ASSAY OF URINE CREATININE: CPT

## 2023-10-26 PROCEDURE — 82306 VITAMIN D 25 HYDROXY: CPT

## 2023-10-26 PROCEDURE — 83735 ASSAY OF MAGNESIUM: CPT

## 2023-10-26 PROCEDURE — 80048 BASIC METABOLIC PNL TOTAL CA: CPT

## 2023-10-26 PROCEDURE — 83970 ASSAY OF PARATHORMONE: CPT

## 2023-10-26 PROCEDURE — 36415 COLL VENOUS BLD VENIPUNCTURE: CPT

## 2023-10-26 PROCEDURE — 84156 ASSAY OF PROTEIN URINE: CPT

## 2023-10-27 LAB — PTH-INTACT SERPL-MCNC: 34 PG/ML (ref 15–65)

## 2023-11-22 ENCOUNTER — OFFICE VISIT (OUTPATIENT)
Dept: NEPHROLOGY | Age: 83
End: 2023-11-22
Payer: MEDICARE

## 2023-11-22 VITALS
WEIGHT: 152 LBS | DIASTOLIC BLOOD PRESSURE: 62 MMHG | OXYGEN SATURATION: 95 % | HEIGHT: 71 IN | HEART RATE: 58 BPM | SYSTOLIC BLOOD PRESSURE: 128 MMHG | BODY MASS INDEX: 21.28 KG/M2

## 2023-11-22 DIAGNOSIS — E83.42 HYPOMAGNESEMIA: ICD-10-CM

## 2023-11-22 DIAGNOSIS — R80.9 PROTEINURIA, UNSPECIFIED TYPE: ICD-10-CM

## 2023-11-22 DIAGNOSIS — I10 PRIMARY HYPERTENSION: ICD-10-CM

## 2023-11-22 DIAGNOSIS — N18.32 STAGE 3B CHRONIC KIDNEY DISEASE (HCC): Primary | ICD-10-CM

## 2023-11-22 DIAGNOSIS — Z90.5 SOLITARY KIDNEY, ACQUIRED: ICD-10-CM

## 2023-11-22 DIAGNOSIS — E78.5 DYSLIPIDEMIA: ICD-10-CM

## 2023-11-22 DIAGNOSIS — E55.9 VITAMIN D DEFICIENCY: ICD-10-CM

## 2023-11-22 PROCEDURE — 1123F ACP DISCUSS/DSCN MKR DOCD: CPT | Performed by: INTERNAL MEDICINE

## 2023-11-22 PROCEDURE — 99213 OFFICE O/P EST LOW 20 MIN: CPT | Performed by: INTERNAL MEDICINE

## 2023-11-22 PROCEDURE — 1036F TOBACCO NON-USER: CPT | Performed by: INTERNAL MEDICINE

## 2023-11-22 PROCEDURE — G8484 FLU IMMUNIZE NO ADMIN: HCPCS | Performed by: INTERNAL MEDICINE

## 2023-11-22 PROCEDURE — G8420 CALC BMI NORM PARAMETERS: HCPCS | Performed by: INTERNAL MEDICINE

## 2023-11-22 PROCEDURE — 3078F DIAST BP <80 MM HG: CPT | Performed by: INTERNAL MEDICINE

## 2023-11-22 PROCEDURE — 3074F SYST BP LT 130 MM HG: CPT | Performed by: INTERNAL MEDICINE

## 2023-11-22 PROCEDURE — G8427 DOCREV CUR MEDS BY ELIG CLIN: HCPCS | Performed by: INTERNAL MEDICINE

## 2023-11-22 RX ORDER — ALBUTEROL SULFATE 2.5 MG/3ML
2.5 SOLUTION RESPIRATORY (INHALATION) EVERY 6 HOURS PRN
COMMUNITY

## 2023-11-22 RX ORDER — AZITHROMYCIN 250 MG/1
250 TABLET, FILM COATED ORAL DAILY
COMMUNITY

## 2023-11-22 RX ORDER — ARFORMOTEROL TARTRATE 15 UG/2ML
1 SOLUTION RESPIRATORY (INHALATION) 2 TIMES DAILY
COMMUNITY

## 2023-11-22 RX ORDER — MULTIVIT WITH MINERALS/LUTEIN
250 TABLET ORAL DAILY
COMMUNITY

## 2023-11-22 NOTE — PROGRESS NOTES
Renal Progress Note    Assessment and Plan:      Diagnosis Orders   1. Stage 3b chronic kidney disease ([address] )  Basic Metabolic Panel      2. Proteinuria, unspecified type  Protein / creatinine ratio, urine      3. Primary hypertension        4. Dyslipidemia        5. Vitamin D deficiency  Vitamin D 25 Hydroxy      6. Solitary kidney, acquired        7. Hypomagnesemia                  PLAN:  Lab result reviewed with the patient and family   They understood well  Addressed their questions  Serum creatinine is improved to 1.4 34  Vitamin D level is good at 35  mg/dl from 1.7 mg/dl in July 2023  PTH is normal at 35  Urine protein creatinine ratio is 310 mg  Medications reviewed   No changes   Return visit in 12 months with labs           Patient Active Problem List   Diagnosis    NAILA (acute kidney injury) ([address])    CKD (chronic kidney disease) stage 3, GFR 30-59 ml/min ([facility])    COPD (chronic obstructive pulmonary disease) ([facility])    HTN (hypertension)    Anemia    Gout    Yeast UTI    Vitamin D deficiency    Hyperkalemia           Subjective:   Chief complaint:  Chief Complaint   Patient presents with    Follow-up     Ckd iiib      HPI:This is a follow up visit for Mr. Trina Oswald here today for return appointment. I see him for chronic increases among other things. He was last seen about 6 months ago. Doing well with no complaint. Denies any  Chest pain   Appetite is good. No recent hospitalization. No difficulty with urination. Here with his family. He has been placed on nasal oxygen since last time I saw him due to his COPD. He is on a wheelchair here today due to deconditioning. ROS:  Pertinent positives stated above in HPI. All other systems were reviewed and were negative.   Medications:     Current Outpatient Medications   Medication Sig Dispense Refill    albuterol (PROVENTIL) (2.5 MG/3ML) 0.083% nebulizer solution Take 3 mLs by nebulization every 6 hours as needed for Wheezing      azithromycin

## 2023-12-11 DIAGNOSIS — E87.5 HYPERKALEMIA: Primary | ICD-10-CM

## 2023-12-11 NOTE — TELEPHONE ENCOUNTER
Patient called stating he had some labs done at the [address] [place] [address] on 12/7/2023. They had informed him that his K+ was elevated (5.9, per patient) and he should notify our office. Faxed request to obtain copy of labs to [phone]. Will route results when obtained. Patient did state that he had been eating a lot of cashews prior to having these labs done. Please advise.

## 2023-12-12 NOTE — TELEPHONE ENCOUNTER
Zaid Cole from [facility] called stating that they received the Lokelma 5g script and the patient was wanting to  today. Stated that they only have 10g in stock. Asking for verbal order for 10g instead of 5g as prescribed. Spoke to Dr. Mingo Aleman, who stated it was okay for the patient to have the 10g instead - he is to take 1 10g packet each day for 3 days. Verbal order given to Zaid Cole, who verbalized understanding.

## 2023-12-12 NOTE — TELEPHONE ENCOUNTER
Called patient and informed him to follow a low potassium diet and that a script for sodium zirconium cyclosilicate would be sent to his pharmacy. Informed patient that he would need to have his potassium checked again in about 5 days. Patient verbalized understanding and stated he would like his prescription sent to [facility]. Pharmacy updated. Labs at [facility] on Monday.

## 2023-12-14 ENCOUNTER — TELEPHONE (OUTPATIENT)
Dept: NEPHROLOGY | Age: 83
End: 2023-12-14

## 2023-12-14 NOTE — TELEPHONE ENCOUNTER
Spoke with patient. He is following the low potassium diet. He will go tomorrow to have lab done to retest potassium level.

## 2023-12-14 NOTE — TELEPHONE ENCOUNTER
Harika Cooper from [place] called to inform Dr. Margaret Anderson that Agustina Delmar potassium was 5.9 on 12/8/23. It is up from 5.7 on 11/30/23.   She is faxing the lab now  Please advise

## 2023-12-15 ENCOUNTER — HOSPITAL ENCOUNTER (OUTPATIENT)
Age: 83
Discharge: HOME | End: 2023-12-15
Payer: MEDICARE

## 2023-12-15 DIAGNOSIS — E87.5 HYPERKALEMIA: ICD-10-CM

## 2023-12-15 LAB — POTASSIUM SERPL-SCNC: 3.6 MEQ/L (ref 3.5–5.2)

## 2023-12-15 PROCEDURE — 36415 COLL VENOUS BLD VENIPUNCTURE: CPT

## 2023-12-15 PROCEDURE — 84132 ASSAY OF SERUM POTASSIUM: CPT

## 2024-05-28 ENCOUNTER — HOSPITAL ENCOUNTER (EMERGENCY)
Age: 84
Discharge: HOME | End: 2024-05-28
Attending: STUDENT IN AN ORGANIZED HEALTH CARE EDUCATION/TRAINING PROGRAM
Payer: OTHER GOVERNMENT

## 2024-05-28 ENCOUNTER — APPOINTMENT (OUTPATIENT)
Dept: CT IMAGING | Age: 84
End: 2024-05-28
Payer: OTHER GOVERNMENT

## 2024-05-28 VITALS
TEMPERATURE: 98.1 F | SYSTOLIC BLOOD PRESSURE: 159 MMHG | WEIGHT: 160 LBS | DIASTOLIC BLOOD PRESSURE: 75 MMHG | OXYGEN SATURATION: 98 % | BODY MASS INDEX: 22.32 KG/M2 | HEART RATE: 60 BPM | RESPIRATION RATE: 20 BRPM

## 2024-05-28 DIAGNOSIS — R10.9 RIGHT FLANK PAIN: ICD-10-CM

## 2024-05-28 DIAGNOSIS — R91.8 PULMONARY NODULES: ICD-10-CM

## 2024-05-28 DIAGNOSIS — J18.9 COMMUNITY ACQUIRED PNEUMONIA, UNSPECIFIED LATERALITY: ICD-10-CM
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
## 2024-05-28 LAB
ALBUMIN SERPL BCP-MCNC: 3 GM/DL (ref 3.4–5)
ALP SERPL-CCNC: 64 U/L (ref 46–116)
ALT SERPL W P-5'-P-CCNC: 16 U/L (ref 14–63)
ANION GAP SERPL CALC-SCNC: 2 MEQ/L (ref 8–16)
AST SERPL W P-5'-P-CCNC: 25 U/L (ref 15–37)
BASOPHILS # BLD: 0.4 % (ref 0–3)
BASOPHILS ABSOLUTE: 0 THOU/MM3 (ref 0–0.1)
BILIRUB SERPL-MCNC: 0.6 MG/DL (ref 0.2–1)
BUN SERPL-MCNC: 44 MG/DL (ref 7–18)
CALCIUM SERPL-MCNC: 10.4 MG/DL (ref 8.5–10.1)
CHLORIDE SERPL-SCNC: 103 MEQ/L (ref 98–107)
CO2 SERPL-SCNC: 36 MEQ/L (ref 21–32)
CREAT SERPL-MCNC: 1.6 MG/DL (ref 0.6–1.3)
EKG Q-T INTERVAL: 394 MS
EKG QRS DURATION: 116 MS
EKG QTC CALCULATION (BAZETT): 386 MS
EKG R AXIS: 83 DEGREES
EKG T AXIS: 70 DEGREES
EKG VENTRICULAR RATE: 58 BPM
EOSINOPHILS ABSOLUTE: 0.2 THOU/MM3 (ref 0–0.5)
EOSINOPHILS RELATIVE PERCENT: 2.3 % (ref 0–4)
GFR SERPL CREATININE-BSD FRML MDRD: 43 ML/MIN/1.73M2
GLUCOSE SERPL-MCNC: 105 MG/DL (ref 74–106)
HCT VFR BLD CALC: 35.6 % (ref 42–52)
HEMOGLOBIN: 10.7 GM/DL (ref 14–18)
IMMATURE GRANS (ABS): 0 THOU/MM3 (ref 0–0.07)
IMMATURE GRANULOCYTES %: 0 %
LYMPHOCYTES # BLD AUTO: 12.7 % (ref 15–47)
LYMPHOCYTES ABSOLUTE: 1.1 THOU/MM3 (ref 1–4.8)
MAGNESIUM SERPL-MCNC: 1.7 MG/DL (ref 1.8–2.4)
MCH RBC QN AUTO: 30.2 PG (ref 26–32)
MCHC RBC AUTO-ENTMCNC: 30.1 GM/DL (ref 31–35)
MCV RBC AUTO: 100.6 FL (ref 80–94)
MONOCYTES: 0.6 THOU/MM3 (ref 0.3–1.3)
MONOCYTES: 6.8 % (ref 0–12)
NEUTROPHILS ABSOLUTE: 7 THOU/MM3 (ref 1.8–7.7)
PDW BLD-RTO: 15.1 % (ref 11.5–14.9)
PLATELET # BLD AUTO: 178 THOU/MM3 (ref 130–400)
PMV BLD AUTO: 10.7 FL (ref 9.4–12.4)
POTASSIUM SERPL-SCNC: 5.1 MEQ/L (ref 3.5–5.1)
PROT SERPL-MCNC: 7 GM/DL (ref 6.4–8.2)
RBC # BLD: 3.54 MILL/MM3 (ref 4.5–6.1)
SEG NEUTROPHILS: 77.6 % (ref 43–75)
SODIUM SERPL-SCNC: 141 MEQ/L (ref 136–145)
TROPONIN, HIGH SENSITIVITY: 11.6 PG/ML (ref 0–76.1)
WBC # BLD: 9 THOU/MM3 (ref 4.8–10.8)

## 2024-05-28 PROCEDURE — 71260 CT THORAX DX C+: CPT

## 2024-05-28 PROCEDURE — 6370000000 HC RX 637 (ALT 250 FOR IP): Performed by: STUDENT IN AN ORGANIZED HEALTH CARE EDUCATION/TRAINING PROGRAM

## 2024-05-28 PROCEDURE — 2580000003 HC RX 258: Performed by: STUDENT IN AN ORGANIZED HEALTH CARE EDUCATION/TRAINING PROGRAM

## 2024-05-28 PROCEDURE — 36415 COLL VENOUS BLD VENIPUNCTURE: CPT

## 2024-05-28 PROCEDURE — 83735 ASSAY OF MAGNESIUM: CPT

## 2024-05-28 PROCEDURE — 76376 3D RENDER W/INTRP POSTPROCES: CPT

## 2024-05-28 PROCEDURE — 70450 CT HEAD/BRAIN W/O DYE: CPT

## 2024-05-28 PROCEDURE — 74177 CT ABD & PELVIS W/CONTRAST: CPT

## 2024-05-28 PROCEDURE — 99285 EMERGENCY DEPT VISIT HI MDM: CPT

## 2024-05-28 PROCEDURE — 84484 ASSAY OF TROPONIN QUANT: CPT

## 2024-05-28 PROCEDURE — 85025 COMPLETE CBC W/AUTO DIFF WBC: CPT

## 2024-05-28 PROCEDURE — 72125 CT NECK SPINE W/O DYE: CPT

## 2024-05-28 PROCEDURE — 80053 COMPREHEN METABOLIC PANEL: CPT

## 2024-05-28 PROCEDURE — 93005 ELECTROCARDIOGRAM TRACING: CPT | Performed by: STUDENT IN AN ORGANIZED HEALTH CARE EDUCATION/TRAINING PROGRAM

## 2024-05-28 PROCEDURE — 6360000004 HC RX CONTRAST MEDICATION: Performed by: STUDENT IN AN ORGANIZED HEALTH CARE EDUCATION/TRAINING PROGRAM

## 2024-05-28 RX ORDER — LIDOCAINE 4 G/G
1 PATCH TOPICAL DAILY
Status: DISCONTINUED | OUTPATIENT
Start: 2024-05-28 | End: 2024-05-28 | Stop reason: HOSPADM

## 2024-05-28 RX ORDER — SODIUM CHLORIDE, SODIUM LACTATE, POTASSIUM CHLORIDE, AND CALCIUM CHLORIDE .6; .31; .03; .02 G/100ML; G/100ML; G/100ML; G/100ML
500 INJECTION, SOLUTION INTRAVENOUS ONCE
Status: COMPLETED | OUTPATIENT
Start: 2024-05-28 | End: 2024-05-28

## 2024-05-28 RX ORDER — ACETAMINOPHEN 500 MG
1000 TABLET ORAL ONCE
Status: COMPLETED | OUTPATIENT
Start: 2024-05-28 | End: 2024-05-28

## 2024-05-28 RX ORDER — AMOXICILLIN AND CLAVULANATE POTASSIUM 875; 125 MG/1; MG/1
1 TABLET, FILM COATED ORAL ONCE
Status: DISCONTINUED | OUTPATIENT
Start: 2024-05-28 | End: 2024-05-28 | Stop reason: HOSPADM

## 2024-05-28 RX ORDER — AMOXICILLIN AND CLAVULANATE POTASSIUM 875; 125 MG/1; MG/1
1 TABLET, FILM COATED ORAL 2 TIMES DAILY
Qty: 20 TABLET | Refills: 0 | Status: SHIPPED | OUTPATIENT
Start: 2024-05-28 | End: 2024-06-07

## 2024-05-28 RX ADMIN — SODIUM CHLORIDE, POTASSIUM CHLORIDE, SODIUM LACTATE AND CALCIUM CHLORIDE 500 ML: 600; 310; 30; 20 INJECTION, SOLUTION INTRAVENOUS at 19:07

## 2024-05-28 RX ADMIN — ACETAMINOPHEN 1000 MG: 500 TABLET ORAL at 19:08

## 2024-05-28 RX ADMIN — IOPAMIDOL 100 ML: 755 INJECTION, SOLUTION INTRAVENOUS at 19:19

## 2024-05-28 ASSESSMENT — PAIN SCALES - GENERAL
PAINLEVEL_OUTOF10: 3
PAINLEVEL_OUTOF10: 4
PAINLEVEL_OUTOF10: 7

## 2024-05-28 ASSESSMENT — PAIN - FUNCTIONAL ASSESSMENT: PAIN_FUNCTIONAL_ASSESSMENT: 0-10

## 2024-05-28 ASSESSMENT — PAIN DESCRIPTION - ORIENTATION: ORIENTATION: RIGHT

## 2024-05-28 ASSESSMENT — PAIN DESCRIPTION - LOCATION: LOCATION: FLANK

## 2024-05-28 NOTE — ED PROVIDER NOTES
[facility] EMERGENCY DEPARTMENT    Patient Name: Vasiliy Heart  MRN: [id number]  YOB: 1940  Date of Evaluation: 5/28/2024  Emergency Physician: Dinesh Ibarra MD    CHIEF COMPLAINT       Chief Complaint   Patient presents with    Flank Pain     Right flank pain    COPD       HISTORY OF PRESENT ILLNESS    HPI    History obtained from chart review and the patient.    Vasiliy is a 83 y.o. old male who presents to the emergency department by Walk In for evaluation of potential injuries following a fall.  Patient reports he fell on Sunday the 26.  He reports he was using his walker and transitioning from a smooth to a carpeted surface and fell backwards hitting his head.  Thinks he may have fell on his right side and has been having right flank and right sided abdominal pain since.  He also has a history of COPD and is chronically on oxygen at home on 2 L/min.  He does have a history of CKD as well.  No recent cough cold fever runny nose or other symptoms.  Did not have any lightheadedness or dizziness or chest pain prior to or after the fall    Chart reviewed, relevant history summarized in HPI above.      REVIEW OF SYSTEMS   Review of Systems  Negative unless documented in HPI    PAST MEDICAL AND SURGICAL HISTORY   Vasiliy has a past medical history of CKD (chronic kidney disease) stage 3, GFR 30-59 ml/min ([facility]), COPD (chronic obstructive pulmonary disease) ([facility]), Gout, and HTN (hypertension).    Vasiliy has a past surgical history that includes Esophagus surgery (2015); TURP; and CT BIOPSY ABDOMEN RETROPERITONEUM (2/23/2022).    CURRENT MEDICATIONS   Vasiliy has a current medication list which includes the following long-term medication(s): b complex vitamins, coq10, zinc gluconate, furosemide, tiotropium, milk thistle, aspirin, allopurinol, and fenofibrate.    ALLERGIES   Vasiliy is allergic to nsaids, sulfa antibiotics, and sulfamethoxazole-trimethoprim.    FAMILY HISTORY   Vasiliy's family history is

## 2024-05-28 NOTE — ED NOTES
CT notified of availability for scanning, pt informed of wait time due to acuity in the unit. Pt remains NPO. Call light in reach, denies needs

## 2024-05-29 NOTE — DISCHARGE INSTRUCTIONS
You were seen today for potential injuries following a fall and right flank pain.  Your imaging does not show any injuries however it does show that she may have a pneumonia or a lung infection.  It also shows that you have lung nodules these may be the same lung nodules that have been previously diagnosed with [facility] and worked up there however I do recommend you follow-[facility] [place] to discuss this further.  Take antibiotics as prescribed for the next 10 days.    If your symptoms are worse including worsening shortness of breath, fever, chills nausea vomiting or other new concerns please come back to the emergency room immediately for reevaluation

## 2024-05-29 NOTE — ED NOTES
Discharge teaching and instructions for condition explained to patient. AVS reviewed. Went over prescriptions with patient. Patient voiced understanding regarding prescriptions, follow up appointments and care of self at home. Pt discharged to home in stable condition per wife's care.   Detail Level: None Kenalog Preparation: kenalog Total Volume (Ccs): 1 Concentration (Mg/Ml): 1.5 Consent: The risks of atrophy were reviewed with the patient. Add Option For Additional Mediation: No Concentration (Mg/Ml) Of Additional Medication: 2.5

## 2024-08-28 ENCOUNTER — TELEPHONE (OUTPATIENT)
Dept: NEPHROLOGY | Age: 84
End: 2024-08-28

## 2024-08-28 NOTE — TELEPHONE ENCOUNTER
Patient called in and wanted you to be notified that he was diagnosed with Lung cancer and will be starting radiation soon.

## 2024-09-19 ENCOUNTER — HOSPITAL ENCOUNTER (OUTPATIENT)
Age: 84
Discharge: HOME | End: 2024-09-19
Payer: MEDICARE

## 2024-09-19 LAB
BASOPHILS ABSOLUTE: 0 THOU/MM3 (ref 0–0.1)
BASOPHILS NFR BLD AUTO: 0.6 %
DEPRECATED RDW RBC AUTO: 56.7 FL (ref 35–45)
EOSINOPHIL NFR BLD AUTO: 3.5 %
EOSINOPHILS ABSOLUTE: 0.3 THOU/MM3 (ref 0–0.4)
ERYTHROCYTE [DISTWIDTH] IN BLOOD BY AUTOMATED COUNT: 15.6 % (ref 11.5–14.5)
HCT VFR BLD AUTO: 34.2 % (ref 42–52)
HGB BLD-MCNC: 10.5 GM/DL (ref 14–18)
IMM GRANULOCYTES # BLD AUTO: 0.01 THOU/MM3 (ref 0–0.07)
IMM GRANULOCYTES NFR BLD AUTO: 0.1 %
LYMPHOCYTES ABSOLUTE: 1.2 THOU/MM3 (ref 1–4.8)
LYMPHOCYTES NFR BLD AUTO: 16.9 %
MCH RBC QN AUTO: 30.3 PG (ref 26–33)
MCHC RBC AUTO-ENTMCNC: 30.7 GM/DL (ref 32.2–35.5)
MCV RBC AUTO: 98.8 FL (ref 80–94)
MONOCYTES ABSOLUTE: 0.5 THOU/MM3 (ref 0.4–1.3)
MONOCYTES NFR BLD AUTO: 6.9 %
NEUTROPHILS ABSOLUTE: 5.2 THOU/MM3 (ref 1.8–7.7)
NEUTROPHILS NFR BLD AUTO: 72 %
NRBC BLD AUTO-RTO: 0 /100 WBC
PLATELET # BLD AUTO: 193 THOU/MM3 (ref 130–400)
PMV BLD AUTO: 12.2 FL (ref 9.4–12.4)
RBC # BLD AUTO: 3.46 MILL/MM3 (ref 4.7–6.1)
WBC # BLD AUTO: 7.2 THOU/MM3 (ref 4.8–10.8)

## 2024-09-19 PROCEDURE — 85025 COMPLETE CBC W/AUTO DIFF WBC: CPT

## 2024-09-19 PROCEDURE — 36415 COLL VENOUS BLD VENIPUNCTURE: CPT

## 2024-09-19 PROCEDURE — 82728 ASSAY OF FERRITIN: CPT

## 2024-09-19 PROCEDURE — 82378 CARCINOEMBRYONIC ANTIGEN: CPT

## 2024-09-19 PROCEDURE — 82746 ASSAY OF FOLIC ACID SERUM: CPT

## 2024-09-19 PROCEDURE — 80053 COMPREHEN METABOLIC PANEL: CPT

## 2024-09-19 PROCEDURE — 82607 VITAMIN B-12: CPT

## 2024-09-19 PROCEDURE — 83615 LACTATE (LD) (LDH) ENZYME: CPT

## 2024-09-20 LAB
ALBUMIN SERPL BCG-MCNC: 3.6 G/DL (ref 3.5–5.1)
ALP SERPL-CCNC: 63 U/L (ref 38–126)
ALT SERPL W/O P-5'-P-CCNC: 11 U/L (ref 11–66)
ANION GAP SERPL CALC-SCNC: 14 MEQ/L (ref 8–16)
AST SERPL-CCNC: 22 U/L (ref 5–40)
BILIRUB SERPL-MCNC: 0.3 MG/DL (ref 0.3–1.2)
BUN SERPL-MCNC: 57 MG/DL (ref 7–22)
CALCIUM SERPL-MCNC: 10.4 MG/DL (ref 8.5–10.5)
CEA SERPL-MCNC: 10 NG/ML (ref 0–5)
CHLORIDE SERPL-SCNC: 96 MEQ/L (ref 98–111)
CO2 SERPL-SCNC: 28 MEQ/L (ref 23–33)
CREAT SERPL-MCNC: 1.5 MG/DL (ref 0.4–1.2)
FERRITIN SERPL IA-MCNC: 112 NG/ML (ref 22–322)
FOLATE SERPL-MCNC: 17.1 NG/ML (ref 4.8–24.2)
GFR SERPL CREATININE-BSD FRML MDRD: 46 ML/MIN/1.73M2
GLUCOSE SERPL-MCNC: 90 MG/DL (ref 70–108)
LDH SERPL L TO P-CCNC: 150 U/L (ref 100–190)
POTASSIUM SERPL-SCNC: 4.8 MEQ/L (ref 3.5–5.2)
PROT SERPL-MCNC: 7.1 G/DL (ref 6.1–8)
SODIUM SERPL-SCNC: 138 MEQ/L (ref 135–145)
VIT B12 SERPL-MCNC: 358 PG/ML (ref 211–911)

## 2024-11-20 ENCOUNTER — HOSPITAL ENCOUNTER (OUTPATIENT)
Age: 84
Discharge: HOME | End: 2024-11-20
Payer: MEDICARE

## 2024-11-20 DIAGNOSIS — E55.9 VITAMIN D DEFICIENCY: ICD-10-CM

## 2024-11-20 DIAGNOSIS — N18.32 STAGE 3B CHRONIC KIDNEY DISEASE (HCC): ICD-10-CM

## 2024-11-20 DIAGNOSIS — R80.9 PROTEINURIA, UNSPECIFIED TYPE: ICD-10-CM

## 2024-11-20 LAB
25(OH)D3 SERPL-MCNC: 49 NG/ML (ref 30–100)
ANION GAP SERPL CALC-SCNC: 13 MEQ/L (ref 8–16)
BUN SERPL-MCNC: 39 MG/DL (ref 7–22)
CALCIUM SERPL-MCNC: 10 MG/DL (ref 8.5–10.5)
CHLORIDE SERPL-SCNC: 101 MEQ/L (ref 98–111)
CO2 SERPL-SCNC: 28 MEQ/L (ref 23–33)
CREAT SERPL-MCNC: 1.3 MG/DL (ref 0.4–1.2)
CREAT UR-MCNC: 44.9 MG/DL
GFR SERPL CREATININE-BSD FRML MDRD: 54 ML/MIN/1.73M2
GLUCOSE SERPL-MCNC: 90 MG/DL (ref 70–108)
POTASSIUM SERPL-SCNC: 4.1 MEQ/L (ref 3.5–5.2)
PROT UR-MCNC: 10.6 MG/DL
PROT/CREAT 24H UR: 0.24 MG/G{CREAT}
SODIUM SERPL-SCNC: 142 MEQ/L (ref 135–145)

## 2024-11-20 PROCEDURE — 80048 BASIC METABOLIC PNL TOTAL CA: CPT

## 2024-11-20 PROCEDURE — 84156 ASSAY OF PROTEIN URINE: CPT

## 2024-11-20 PROCEDURE — 36415 COLL VENOUS BLD VENIPUNCTURE: CPT

## 2024-11-20 PROCEDURE — 82306 VITAMIN D 25 HYDROXY: CPT

## 2024-11-20 PROCEDURE — 82570 ASSAY OF URINE CREATININE: CPT

## 2024-11-27 ENCOUNTER — HOSPITAL ENCOUNTER (OUTPATIENT)
Age: 84
Discharge: HOME | End: 2024-11-27
Payer: MEDICARE

## 2024-11-27 ENCOUNTER — OFFICE VISIT (OUTPATIENT)
Dept: NEPHROLOGY | Age: 84
End: 2024-11-27
Payer: MEDICARE

## 2024-11-27 VITALS
WEIGHT: 151 LBS | BODY MASS INDEX: 21.14 KG/M2 | OXYGEN SATURATION: 96 % | DIASTOLIC BLOOD PRESSURE: 50 MMHG | SYSTOLIC BLOOD PRESSURE: 115 MMHG | HEART RATE: 60 BPM | HEIGHT: 71 IN

## 2024-11-27 DIAGNOSIS — Z90.5 SOLITARY KIDNEY, ACQUIRED: ICD-10-CM

## 2024-11-27 DIAGNOSIS — N18.31 STAGE 3A CHRONIC KIDNEY DISEASE (HCC): Primary | ICD-10-CM

## 2024-11-27 DIAGNOSIS — I10 PRIMARY HYPERTENSION: ICD-10-CM

## 2024-11-27 DIAGNOSIS — E55.9 VITAMIN D DEFICIENCY: ICD-10-CM

## 2024-11-27 LAB
ALBUMIN SERPL BCG-MCNC: 3.6 G/DL (ref 3.5–5.1)
ALP SERPL-CCNC: 75 U/L (ref 38–126)
ALT SERPL W/O P-5'-P-CCNC: 12 U/L (ref 11–66)
ANION GAP SERPL CALC-SCNC: 10 MEQ/L (ref 8–16)
AST SERPL-CCNC: 24 U/L (ref 5–40)
BILIRUB SERPL-MCNC: 0.4 MG/DL (ref 0.3–1.2)
BUN SERPL-MCNC: 48 MG/DL (ref 7–22)
CALCIUM SERPL-MCNC: 10.5 MG/DL (ref 8.5–10.5)
CHLORIDE SERPL-SCNC: 100 MEQ/L (ref 98–111)
CO2 SERPL-SCNC: 30 MEQ/L (ref 23–33)
CREAT SERPL-MCNC: 1.4 MG/DL (ref 0.4–1.2)
DEPRECATED RDW RBC AUTO: 63.9 FL (ref 35–45)
ERYTHROCYTE [DISTWIDTH] IN BLOOD BY AUTOMATED COUNT: 16.9 % (ref 11.5–14.5)
FERRITIN SERPL IA-MCNC: 719 NG/ML (ref 22–322)
GFR SERPL CREATININE-BSD FRML MDRD: 50 ML/MIN/1.73M2
GLUCOSE SERPL-MCNC: 80 MG/DL (ref 70–108)
HCT VFR BLD AUTO: 34.4 % (ref 42–52)
HGB BLD-MCNC: 10.4 GM/DL (ref 14–18)
LDH SERPL L TO P-CCNC: 157 U/L (ref 100–190)
MCH RBC QN AUTO: 31.1 PG (ref 26–33)
MCHC RBC AUTO-ENTMCNC: 30.2 GM/DL (ref 32.2–35.5)
MCV RBC AUTO: 103 FL (ref 80–94)
PLATELET # BLD AUTO: 166 THOU/MM3 (ref 130–400)
PMV BLD AUTO: 11.4 FL (ref 9.4–12.4)
POTASSIUM SERPL-SCNC: 4.7 MEQ/L (ref 3.5–5.2)
PROT SERPL-MCNC: 7 G/DL (ref 6.1–8)
RBC # BLD AUTO: 3.34 MILL/MM3 (ref 4.7–6.1)
SODIUM SERPL-SCNC: 140 MEQ/L (ref 135–145)
WBC # BLD AUTO: 5.4 THOU/MM3 (ref 4.8–10.8)

## 2024-11-27 PROCEDURE — 82607 VITAMIN B-12: CPT

## 2024-11-27 PROCEDURE — 1036F TOBACCO NON-USER: CPT | Performed by: INTERNAL MEDICINE

## 2024-11-27 PROCEDURE — G8420 CALC BMI NORM PARAMETERS: HCPCS | Performed by: INTERNAL MEDICINE

## 2024-11-27 PROCEDURE — G8484 FLU IMMUNIZE NO ADMIN: HCPCS | Performed by: INTERNAL MEDICINE

## 2024-11-27 PROCEDURE — 82728 ASSAY OF FERRITIN: CPT

## 2024-11-27 PROCEDURE — 82378 CARCINOEMBRYONIC ANTIGEN: CPT

## 2024-11-27 PROCEDURE — G8427 DOCREV CUR MEDS BY ELIG CLIN: HCPCS | Performed by: INTERNAL MEDICINE

## 2024-11-27 PROCEDURE — 85027 COMPLETE CBC AUTOMATED: CPT

## 2024-11-27 PROCEDURE — 3074F SYST BP LT 130 MM HG: CPT | Performed by: INTERNAL MEDICINE

## 2024-11-27 PROCEDURE — 1159F MED LIST DOCD IN RCRD: CPT | Performed by: INTERNAL MEDICINE

## 2024-11-27 PROCEDURE — 83615 LACTATE (LD) (LDH) ENZYME: CPT

## 2024-11-27 PROCEDURE — 3078F DIAST BP <80 MM HG: CPT | Performed by: INTERNAL MEDICINE

## 2024-11-27 PROCEDURE — 82746 ASSAY OF FOLIC ACID SERUM: CPT

## 2024-11-27 PROCEDURE — 99213 OFFICE O/P EST LOW 20 MIN: CPT | Performed by: INTERNAL MEDICINE

## 2024-11-27 PROCEDURE — 1123F ACP DISCUSS/DSCN MKR DOCD: CPT | Performed by: INTERNAL MEDICINE

## 2024-11-27 PROCEDURE — 36415 COLL VENOUS BLD VENIPUNCTURE: CPT

## 2024-11-27 PROCEDURE — 80053 COMPREHEN METABOLIC PANEL: CPT

## 2024-11-27 RX ORDER — SERTRALINE HYDROCHLORIDE 100 MG/1
100 TABLET, FILM COATED ORAL DAILY
COMMUNITY
Start: 2024-08-01

## 2024-11-27 NOTE — PROGRESS NOTES
Vasiliy was diagnosed with lung cancer a few months ago. He has completed radiation 2 double doses. No chemo. He will have another scan in January. He is seeing Dr. Villeda from [facility]. He is supposed to be on 02 Nc 2.5 liters all the time. He did not bring it with him today.   
  Respiratory:  Clear to auscultation with no wheezes or rales  Abdomen: Good bowel sound, soft, non tender and no bruit  Ext: No LE edema  Musculoskeletal:Intact  Neuro:Alert, awake and oriented with no obvious focal deficit.  Speech is normal.**    Electronically signed by Richard Michelle MD on 11/27/2024 at 11:33 AM   **This report has been created using voice recognition software. It maycontain minor  errors which are inherent in voice recognition technology.**

## 2024-11-28 LAB
CEA SERPL-MCNC: 9.5 NG/ML (ref 0–5)
FOLATE SERPL-MCNC: 11.1 NG/ML (ref 4.8–24.2)
VIT B12 SERPL-MCNC: 781 PG/ML (ref 211–911)

## 2025-02-27 ENCOUNTER — HOSPITAL ENCOUNTER (EMERGENCY)
Age: 85
Discharge: HOME | End: 2025-02-27
Attending: EMERGENCY MEDICINE
Payer: OTHER GOVERNMENT

## 2025-02-27 ENCOUNTER — APPOINTMENT (OUTPATIENT)
Dept: CT IMAGING | Age: 85
End: 2025-02-27
Payer: OTHER GOVERNMENT

## 2025-02-27 VITALS
SYSTOLIC BLOOD PRESSURE: 122 MMHG | BODY MASS INDEX: 20.32 KG/M2 | RESPIRATION RATE: 22 BRPM | DIASTOLIC BLOOD PRESSURE: 58 MMHG | OXYGEN SATURATION: 95 % | HEART RATE: 64 BPM | WEIGHT: 150 LBS | HEIGHT: 72 IN | TEMPERATURE: 97 F

## 2025-02-27 DIAGNOSIS — S01.01XA LACERATION OF SCALP, INITIAL ENCOUNTER: Primary | ICD-10-CM

## 2025-02-27 DIAGNOSIS — S09.90XA CLOSED HEAD INJURY, INITIAL ENCOUNTER: ICD-10-CM

## 2025-02-27 PROCEDURE — 6360000002 HC RX W HCPCS: Performed by: EMERGENCY MEDICINE

## 2025-02-27 PROCEDURE — 90715 TDAP VACCINE 7 YRS/> IM: CPT | Performed by: EMERGENCY MEDICINE

## 2025-02-27 PROCEDURE — 99284 EMERGENCY DEPT VISIT MOD MDM: CPT

## 2025-02-27 PROCEDURE — 12001 RPR S/N/AX/GEN/TRNK 2.5CM/<: CPT

## 2025-02-27 PROCEDURE — 70450 CT HEAD/BRAIN W/O DYE: CPT

## 2025-02-27 PROCEDURE — 90471 IMMUNIZATION ADMIN: CPT | Performed by: EMERGENCY MEDICINE

## 2025-02-27 RX ORDER — LIDOCAINE HYDROCHLORIDE 10 MG/ML
5 INJECTION, SOLUTION INFILTRATION; PERINEURAL ONCE
Status: COMPLETED | OUTPATIENT
Start: 2025-02-27 | End: 2025-02-27

## 2025-02-27 RX ADMIN — LIDOCAINE HYDROCHLORIDE 5 ML: 10 INJECTION, SOLUTION INFILTRATION; PERINEURAL at 11:30

## 2025-02-27 RX ADMIN — TETANUS TOXOID, REDUCED DIPHTHERIA TOXOID AND ACELLULAR PERTUSSIS VACCINE, ADSORBED 0.5 ML: 5; 2.5; 8; 8; 2.5 SUSPENSION INTRAMUSCULAR at 12:04

## 2025-02-27 ASSESSMENT — PAIN SCALES - GENERAL: PAINLEVEL_OUTOF10: 2

## 2025-02-27 ASSESSMENT — PAIN - FUNCTIONAL ASSESSMENT
PAIN_FUNCTIONAL_ASSESSMENT: 0-10
PAIN_FUNCTIONAL_ASSESSMENT: NONE - DENIES PAIN

## 2025-02-27 ASSESSMENT — PAIN DESCRIPTION - LOCATION: LOCATION: HEAD

## 2025-02-27 ASSESSMENT — LIFESTYLE VARIABLES: HOW OFTEN DO YOU HAVE A DRINK CONTAINING ALCOHOL: NEVER
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
## 2025-02-27 NOTE — ED NOTES
Pt alert and oriented X's 3. 5 staples intact and laceration to scalp well approximated. Cleansed w/ wound wash. . Respirations regular and easy. Discharge instructions reviewed w/ pt and family. State understanding. Pt discharged in satisfactory condition.

## 2025-02-27 NOTE — DISCHARGE INSTRUCTIONS
Monitor for infection redness or swelling.  Do not be surprised there can be some oozing of blood from the area.  Keep pressure on the area.  Monitor for infection redness or worsening problems.  Staples out in 1 week.

## 2025-02-27 NOTE — ED NOTES
Pt presents w/ a laceration to his head. Reports that he was walking at home, with his walker, when his legs gave out and he fell striking his head on a stool. Small hematoma noted to site. Denies LOC. Denies any other injuries. Respirations regular and easy. No distress noted.

## 2025-03-06 ENCOUNTER — LAB (OUTPATIENT)
Dept: LAB | Age: 85
End: 2025-03-06

## 2025-03-06 LAB
ALBUMIN SERPL BCG-MCNC: 3.6 G/DL (ref 3.4–4.9)
ALP SERPL-CCNC: 68 U/L (ref 40–129)
ALT SERPL W/O P-5'-P-CCNC: 16 U/L (ref 10–50)
ANION GAP SERPL CALC-SCNC: 13 MEQ/L (ref 8–16)
AST SERPL-CCNC: 29 U/L (ref 10–50)
BASOPHILS ABSOLUTE: 0 THOU/MM3 (ref 0–0.1)
BASOPHILS NFR BLD AUTO: 0.3 %
BILIRUB SERPL-MCNC: 0.3 MG/DL (ref 0.3–1.2)
BUN SERPL-MCNC: 48 MG/DL (ref 8–23)
CALCIUM SERPL-MCNC: 10.4 MG/DL (ref 8.8–10.2)
CHLORIDE SERPL-SCNC: 99 MEQ/L (ref 98–111)
CO2 SERPL-SCNC: 28 MEQ/L (ref 22–29)
CREAT SERPL-MCNC: 1.5 MG/DL (ref 0.7–1.2)
DEPRECATED RDW RBC AUTO: 55.4 FL (ref 35–45)
EOSINOPHIL NFR BLD AUTO: 2.8 %
EOSINOPHILS ABSOLUTE: 0.2 THOU/MM3 (ref 0–0.4)
ERYTHROCYTE [DISTWIDTH] IN BLOOD BY AUTOMATED COUNT: 15 % (ref 11.5–14.5)
FERRITIN SERPL IA-MCNC: 580 NG/ML (ref 30–400)
GFR SERPL CREATININE-BSD FRML MDRD: 46 ML/MIN/1.73M2
GLUCOSE SERPL-MCNC: 88 MG/DL (ref 74–109)
HCT VFR BLD AUTO: 34.2 % (ref 42–52)
HGB BLD-MCNC: 10.5 GM/DL (ref 14–18)
IMM GRANULOCYTES # BLD AUTO: 0.03 THOU/MM3 (ref 0–0.07)
IMM GRANULOCYTES NFR BLD AUTO: 0.5 %
LDH SERPL L TO P-CCNC: 179 U/L (ref 135–225)
LYMPHOCYTES ABSOLUTE: 0.9 THOU/MM3 (ref 1–4.8)
LYMPHOCYTES NFR BLD AUTO: 13.3 %
MAGNESIUM SERPL-MCNC: 1.9 MG/DL (ref 1.6–2.6)
MCH RBC QN AUTO: 30.9 PG (ref 26–33)
MCHC RBC AUTO-ENTMCNC: 30.7 GM/DL (ref 32.2–35.5)
MCV RBC AUTO: 100.6 FL (ref 80–94)
MONOCYTES ABSOLUTE: 0.5 THOU/MM3 (ref 0.4–1.3)
MONOCYTES NFR BLD AUTO: 7.8 %
NEUTROPHILS ABSOLUTE: 4.9 THOU/MM3 (ref 1.8–7.7)
NEUTROPHILS NFR BLD AUTO: 75.3 %
NRBC BLD AUTO-RTO: 0 /100 WBC
PHOSPHATE SERPL-MCNC: 2.3 MG/DL (ref 2.5–4.5)
PLATELET # BLD AUTO: 199 THOU/MM3 (ref 130–400)
PMV BLD AUTO: 11.7 FL (ref 9.4–12.4)
POTASSIUM SERPL-SCNC: 4.1 MEQ/L (ref 3.5–5.2)
PROT SERPL-MCNC: 6.9 G/DL (ref 6.4–8.3)
RBC # BLD AUTO: 3.4 MILL/MM3 (ref 4.7–6.1)
SODIUM SERPL-SCNC: 140 MEQ/L (ref 135–145)
VIT B12 SERPL-MCNC: 1137 PG/ML (ref 232–1245)
WBC # BLD AUTO: 6.5 THOU/MM3 (ref 4.8–10.8)

## 2025-03-07 LAB — CEA SERPL-MCNC: 9.1 NG/ML (ref 0–3.8)

## 2025-04-16 ENCOUNTER — HOSPITAL ENCOUNTER (OUTPATIENT)
Age: 85
Discharge: HOME | End: 2025-04-16
Payer: MEDICARE

## 2025-04-16 LAB
ALBUMIN SERPL BCG-MCNC: 3.7 G/DL (ref 3.4–4.9)
ALP SERPL-CCNC: 65 U/L (ref 40–129)
ALT SERPL W/O P-5'-P-CCNC: 14 U/L (ref 10–50)
ANION GAP SERPL CALC-SCNC: 12 MEQ/L (ref 8–16)
AST SERPL-CCNC: 26 U/L (ref 10–50)
BASOPHILS ABSOLUTE: 0 THOU/MM3 (ref 0–0.1)
BASOPHILS NFR BLD AUTO: 0.5 %
BILIRUB SERPL-MCNC: 0.4 MG/DL (ref 0.3–1.2)
BUN SERPL-MCNC: 38 MG/DL (ref 8–23)
CALCIUM SERPL-MCNC: 10.1 MG/DL (ref 8.8–10.2)
CHLORIDE SERPL-SCNC: 98 MEQ/L (ref 98–111)
CO2 SERPL-SCNC: 29 MEQ/L (ref 22–29)
CREAT SERPL-MCNC: 1.4 MG/DL (ref 0.7–1.2)
DEPRECATED RDW RBC AUTO: 56.4 FL (ref 35–45)
EOSINOPHIL NFR BLD AUTO: 2.6 %
EOSINOPHILS ABSOLUTE: 0.2 THOU/MM3 (ref 0–0.4)
ERYTHROCYTE [DISTWIDTH] IN BLOOD BY AUTOMATED COUNT: 15 % (ref 11.5–14.5)
FERRITIN SERPL IA-MCNC: 541 NG/ML (ref 30–400)
FOLATE SERPL-MCNC: 10.7 NG/ML (ref 4.6–34.8)
GFR SERPL CREATININE-BSD FRML MDRD: 50 ML/MIN/1.73M2
GLUCOSE SERPL-MCNC: 86 MG/DL (ref 74–109)
HCT VFR BLD AUTO: 36.1 % (ref 42–52)
HGB BLD-MCNC: 11.3 GM/DL (ref 14–18)
IMM GRANULOCYTES # BLD AUTO: 0.01 THOU/MM3 (ref 0–0.07)
IMM GRANULOCYTES NFR BLD AUTO: 0.2 %
IRON SATN MFR SERPL: 14 % (ref 20–50)
IRON SERPL-MCNC: 57 UG/DL (ref 61–157)
LDH SERPL L TO P-CCNC: 170 U/L (ref 135–225)
LYMPHOCYTES ABSOLUTE: 1 THOU/MM3 (ref 1–4.8)
LYMPHOCYTES NFR BLD AUTO: 16.3 %
MCH RBC QN AUTO: 31.6 PG (ref 26–33)
MCHC RBC AUTO-ENTMCNC: 31.3 GM/DL (ref 32.2–35.5)
MCV RBC AUTO: 100.8 FL (ref 80–94)
MONOCYTES ABSOLUTE: 0.5 THOU/MM3 (ref 0.4–1.3)
MONOCYTES NFR BLD AUTO: 8.7 %
NEUTROPHILS ABSOLUTE: 4.4 THOU/MM3 (ref 1.8–7.7)
NEUTROPHILS NFR BLD AUTO: 71.7 %
NRBC BLD AUTO-RTO: 0 /100 WBC
PLATELET # BLD AUTO: 190 THOU/MM3 (ref 130–400)
PMV BLD AUTO: 11.6 FL (ref 9.4–12.4)
POTASSIUM SERPL-SCNC: 3.8 MEQ/L (ref 3.5–5.2)
PROT SERPL-MCNC: 7 G/DL (ref 6.4–8.3)
RBC # BLD AUTO: 3.58 MILL/MM3 (ref 4.7–6.1)
SODIUM SERPL-SCNC: 139 MEQ/L (ref 135–145)
TIBC SERPL-MCNC: 408 UG/DL (ref 171–450)
VIT B12 SERPL-MCNC: 1357 PG/ML (ref 232–1245)
WBC # BLD AUTO: 6.2 THOU/MM3 (ref 4.8–10.8)

## 2025-04-16 PROCEDURE — 83540 ASSAY OF IRON: CPT

## 2025-04-16 PROCEDURE — 82378 CARCINOEMBRYONIC ANTIGEN: CPT

## 2025-04-16 PROCEDURE — 83550 IRON BINDING TEST: CPT

## 2025-04-16 PROCEDURE — 82607 VITAMIN B-12: CPT

## 2025-04-16 PROCEDURE — 85025 COMPLETE CBC W/AUTO DIFF WBC: CPT

## 2025-04-16 PROCEDURE — 82746 ASSAY OF FOLIC ACID SERUM: CPT

## 2025-04-16 PROCEDURE — 83615 LACTATE (LD) (LDH) ENZYME: CPT

## 2025-04-16 PROCEDURE — 36415 COLL VENOUS BLD VENIPUNCTURE: CPT

## 2025-04-16 PROCEDURE — 80053 COMPREHEN METABOLIC PANEL: CPT

## 2025-04-16 PROCEDURE — 82728 ASSAY OF FERRITIN: CPT

## 2025-04-17 LAB — CEA SERPL-MCNC: 10.1 NG/ML (ref 0–3.8)

## 2025-05-08 ENCOUNTER — TELEPHONE (OUTPATIENT)
Dept: NEPHROLOGY | Age: 85
End: 2025-05-08

## 2025-05-08 NOTE — TELEPHONE ENCOUNTER
Pt called in and his lung doctor wants him to increase his protein drink from qd to bid is this ok with dr reyes? Rohan [phone]

## 2025-05-29 ENCOUNTER — HOSPITAL ENCOUNTER (EMERGENCY)
Age: 85
Discharge: HOME | End: 2025-05-29
Attending: EMERGENCY MEDICINE
Payer: OTHER GOVERNMENT

## 2025-05-29 ENCOUNTER — APPOINTMENT (OUTPATIENT)
Dept: GENERAL RADIOLOGY | Age: 85
End: 2025-05-29
Payer: OTHER GOVERNMENT

## 2025-05-29 VITALS
WEIGHT: 135 LBS | OXYGEN SATURATION: 96 % | RESPIRATION RATE: 17 BRPM | BODY MASS INDEX: 18.9 KG/M2 | DIASTOLIC BLOOD PRESSURE: 68 MMHG | HEIGHT: 71 IN | SYSTOLIC BLOOD PRESSURE: 131 MMHG | HEART RATE: 74 BPM | TEMPERATURE: 97.9 F

## 2025-05-29 DIAGNOSIS — J18.9 PNEUMONIA OF BOTH LOWER LOBES DUE TO INFECTIOUS ORGANISM: Primary | ICD-10-CM

## 2025-05-29 DIAGNOSIS — J44.1 COPD EXACERBATION (HCC): ICD-10-CM

## 2025-05-29 DIAGNOSIS — C34.90 SQUAMOUS CELL CARCINOMA OF LUNG, UNSPECIFIED LATERALITY (HCC): ICD-10-CM

## 2025-05-29 LAB
ALBUMIN SERPL BCP-MCNC: 3.2 GM/DL (ref 3.4–5)
ALP SERPL-CCNC: 90 U/L (ref 46–116)
ALT SERPL W P-5'-P-CCNC: 21 U/L (ref 14–63)
ANION GAP SERPL CALC-SCNC: 7 MEQ/L (ref 8–16)
AST SERPL W P-5'-P-CCNC: 31 U/L (ref 15–37)
BASOPHILS # BLD: 0.4 % (ref 0–3)
BASOPHILS ABSOLUTE: 0 THOU/MM3 (ref 0–0.1)
BILIRUB SERPL-MCNC: 0.7 MG/DL (ref 0.2–1)
BUN SERPL-MCNC: 41 MG/DL (ref 7–18)
CALCIUM SERPL-MCNC: 10.9 MG/DL (ref 8.5–10.1)
CHLORIDE SERPL-SCNC: 101 MEQ/L (ref 98–107)
CO2 SERPL-SCNC: 34 MEQ/L (ref 21–32)
CREAT SERPL-MCNC: 1.6 MG/DL (ref 0.6–1.3)
EKG Q-T INTERVAL: 406 MS
EKG QRS DURATION: 124 MS
EKG QTC CALCULATION (BAZETT): 415 MS
EKG R AXIS: 81 DEGREES
EKG T AXIS: 58 DEGREES
EKG VENTRICULAR RATE: 63 BPM
EOSINOPHILS ABSOLUTE: 0.1 THOU/MM3 (ref 0–0.5)
EOSINOPHILS RELATIVE PERCENT: 1.3 % (ref 0–4)
GFR SERPL CREATININE-BSD FRML MDRD: 42 ML/MIN/1.73M2
GLUCOSE SERPL-MCNC: 103 MG/DL (ref 74–106)
HCT VFR BLD CALC: 40.6 % (ref 42–52)
HEMOGLOBIN: 12.3 GM/DL (ref 14–18)
IMMATURE GRANS (ABS): 0 THOU/MM3 (ref 0–0.07)
IMMATURE GRANULOCYTES %: 0 %
LACTATE: 2.2 MMOL/L (ref 0.9–1.7)
LYMPHOCYTES # BLD AUTO: 8.1 % (ref 15–47)
LYMPHOCYTES ABSOLUTE: 0.8 THOU/MM3 (ref 1–4.8)
MAGNESIUM SERPL-MCNC: 1.8 MG/DL (ref 1.8–2.4)
MCH RBC QN AUTO: 30.5 PG (ref 26–32)
MCHC RBC AUTO-ENTMCNC: 30.3 GM/DL (ref 31–35)
MCV RBC AUTO: 100.7 FL (ref 80–94)
MONOCYTES: 0.6 THOU/MM3 (ref 0.3–1.3)
MONOCYTES: 5.5 % (ref 0–12)
NEUTROPHILS ABSOLUTE: 8.8 THOU/MM3 (ref 1.8–7.7)
NT PRO BNP: 1299 PG/ML (ref 0–450)
PDW BLD-RTO: 14.4 % (ref 11.5–14.9)
PLATELET # BLD AUTO: 223 THOU/MM3 (ref 130–400)
PMV BLD AUTO: 10.5 FL (ref 9.4–12.4)
POTASSIUM SERPL-SCNC: 4.1 MEQ/L (ref 3.5–5.1)
PROT SERPL-MCNC: 8.2 GM/DL (ref 6.4–8.2)
RBC # BLD: 4.03 MILL/MM3 (ref 4.5–6.1)
SEG NEUTROPHILS: 84.5 % (ref 43–75)
SODIUM SERPL-SCNC: 142 MEQ/L (ref 136–145)
TROPONIN, HIGH SENSITIVITY: 13.2 PG/ML (ref 0–76.1)
WBC # BLD: 10.4 THOU/MM3 (ref 4.8–10.8)

## 2025-05-29 PROCEDURE — 83880 ASSAY OF NATRIURETIC PEPTIDE: CPT

## 2025-05-29 PROCEDURE — 6370000000 HC RX 637 (ALT 250 FOR IP)

## 2025-05-29 PROCEDURE — 87040 BLOOD CULTURE FOR BACTERIA: CPT

## 2025-05-29 PROCEDURE — 2500000003 HC RX 250 WO HCPCS: Performed by: EMERGENCY MEDICINE

## 2025-05-29 PROCEDURE — 83605 ASSAY OF LACTIC ACID: CPT

## 2025-05-29 PROCEDURE — 96374 THER/PROPH/DIAG INJ IV PUSH: CPT

## 2025-05-29 PROCEDURE — 84484 ASSAY OF TROPONIN QUANT: CPT

## 2025-05-29 PROCEDURE — 99285 EMERGENCY DEPT VISIT HI MDM: CPT

## 2025-05-29 PROCEDURE — 71045 X-RAY EXAM CHEST 1 VIEW: CPT

## 2025-05-29 PROCEDURE — 80053 COMPREHEN METABOLIC PANEL: CPT

## 2025-05-29 PROCEDURE — 6370000000 HC RX 637 (ALT 250 FOR IP): Performed by: EMERGENCY MEDICINE

## 2025-05-29 PROCEDURE — 6360000002 HC RX W HCPCS: Performed by: EMERGENCY MEDICINE

## 2025-05-29 PROCEDURE — 83735 ASSAY OF MAGNESIUM: CPT

## 2025-05-29 PROCEDURE — 85025 COMPLETE CBC W/AUTO DIFF WBC: CPT

## 2025-05-29 PROCEDURE — 93005 ELECTROCARDIOGRAM TRACING: CPT | Performed by: EMERGENCY MEDICINE

## 2025-05-29 PROCEDURE — 96375 TX/PRO/DX INJ NEW DRUG ADDON: CPT

## 2025-05-29 RX ORDER — AZITHROMYCIN 250 MG/1
250 TABLET, FILM COATED ORAL ONCE
Status: COMPLETED | OUTPATIENT
Start: 2025-05-29 | End: 2025-05-29

## 2025-05-29 RX ORDER — IPRATROPIUM BROMIDE AND ALBUTEROL SULFATE 2.5; .5 MG/3ML; MG/3ML
1 SOLUTION RESPIRATORY (INHALATION) ONCE
Status: COMPLETED | OUTPATIENT
Start: 2025-05-29 | End: 2025-05-29

## 2025-05-29 RX ORDER — AZITHROMYCIN 250 MG/1
250 TABLET, FILM COATED ORAL DAILY
Qty: 4 TABLET | Refills: 0 | Status: SHIPPED | OUTPATIENT
Start: 2025-05-29 | End: 2025-06-02

## 2025-05-29 RX ORDER — CEFDINIR 300 MG/1
300 CAPSULE ORAL 2 TIMES DAILY
Qty: 14 CAPSULE | Refills: 0 | Status: SHIPPED | OUTPATIENT
Start: 2025-05-29 | End: 2025-06-05

## 2025-05-29 RX ORDER — PREDNISONE 20 MG/1
20 TABLET ORAL 2 TIMES DAILY
Qty: 10 TABLET | Refills: 0 | Status: SHIPPED | OUTPATIENT
Start: 2025-05-29 | End: 2025-06-03

## 2025-05-29 RX ORDER — IPRATROPIUM BROMIDE AND ALBUTEROL SULFATE 2.5; .5 MG/3ML; MG/3ML
SOLUTION RESPIRATORY (INHALATION)
Status: COMPLETED
Start: 2025-05-29 | End: 2025-05-29

## 2025-05-29 RX ADMIN — AZITHROMYCIN DIHYDRATE 250 MG: 250 TABLET ORAL at 18:45

## 2025-05-29 RX ADMIN — IPRATROPIUM BROMIDE AND ALBUTEROL SULFATE 1 DOSE: .5; 3 SOLUTION RESPIRATORY (INHALATION) at 18:46

## 2025-05-29 RX ADMIN — WATER 125 MG: 1 INJECTION INTRAMUSCULAR; INTRAVENOUS; SUBCUTANEOUS at 18:02

## 2025-05-29 RX ADMIN — IPRATROPIUM BROMIDE AND ALBUTEROL SULFATE 3 ML: .5; 3 SOLUTION RESPIRATORY (INHALATION) at 17:46

## 2025-05-29 RX ADMIN — WATER 1000 MG: 1 INJECTION INTRAMUSCULAR; INTRAVENOUS; SUBCUTANEOUS at 18:46

## 2025-05-29 ASSESSMENT — ENCOUNTER SYMPTOMS
SHORTNESS OF BREATH: 1
VOMITING: 1
SORE THROAT: 0
HEMOPTYSIS: 0
ABDOMINAL PAIN: 0
WHEEZING: 1
SPUTUM PRODUCTION: 1
COUGH: 1
NAUSEA: 0

## 2025-05-29 ASSESSMENT — PAIN - FUNCTIONAL ASSESSMENT: PAIN_FUNCTIONAL_ASSESSMENT: NONE - DENIES PAIN

## 2025-05-29 NOTE — ED NOTES
Pt. Arrives to ED via w/c accompanied per daughter and wife with c/o \"pneumonia\" st. Pt. Family voices he was [facility]ologist [facility] [place].   Family reports increased congestion and cough for past 2-3 days,  pt. Denies any pain, pt. Is on Home O2 @ 2-2.5 liters/NC.  Pt. Taken to exam 2 and placed on continous cardiac monitor showing A-fib @ 63, no ectopy.   O2 continued via NC.

## 2025-05-29 NOTE — ED PROVIDER NOTES
[facility]  eMERGENCY dEPARTMENT eNCOUnter             [facility] CARE [place]    CHIEF COMPLAINT    Chief Complaint   Patient presents with    Shortness of Breath       Nurses Notes reviewed and I agree except as noted in the HPI.    HPI    Vasiliy Heart is a 84 y.o. male who presents with progressively worsening shortness of breath and sputum production over the last 2 to 3 days.  He saw his cardiologist yesterday, who felt that his heart was stable.  He did not feel that his difficulty breathing had any relationship to his heart.  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  He has chronic oxygen at 2-1/2 L per nasal cannula, has a nebulizer, percussor, and a noninvasive ventilator in his home.  He has not taken a breathing treatment since yesterday.  He did take his home meds today.  He is on no antibiotics, no steroids currently.  He denies any chest pain.  He is moderately dyspneic on arrival.    He has had prior radiation treatment for squamous cell carcinoma of the lung, and is not currently having any kind of treatment for that.  His providers are at [facility].    REVIEW OF SYSTEMS      Review of Systems   Constitutional:  Positive for malaise/fatigue and weight loss. Negative for chills, diaphoresis and fever.   HENT:  Positive for congestion. Negative for sore throat.    Respiratory:  Positive for cough, sputum production, shortness of breath and wheezing. Negative for hemoptysis.    Cardiovascular:  Negative for chest pain, palpitations and leg swelling.   Gastrointestinal:  Positive for vomiting (Typically posttussive). Negative for abdominal pain and nausea.   Neurological:  Positive for weakness. Negative for focal weakness.   All other systems reviewed and are negative.        PAST MEDICAL HISTORY     has a past medical history of CKD (chronic kidney disease) stage 3, GFR 30-59 ml/min ([facility]), COPD (chronic    Skin:     General: Skin is warm and dry.      Capillary Refill: Capillary refill takes 2 to 3 seconds.   Neurological:      General: No focal deficit present.      Mental Status: He is alert and oriented to person, place, and time.   Psychiatric:         Behavior: Behavior normal.          DIFFERENTIAL DIAGNOSIS:    COPD exacerbation, pneumonia, CHF      DIAGNOSTIC RESULTS    EKG my reading:    Atrial fibrillation (chronic), controlled ventricular response with rate 63, right bundle branch block, old, no acute pattern of infarct or ischemia.    RADIOLOGY:    XR CHEST PORTABLE   Final Result   Mild bilateral right more than left atelectasis.      This document has been electronically signed by: Rl Velasquez MD on    05/29/2025 06:37 PM            LABS:     Labs Reviewed   LACTIC ACID - Abnormal; Notable for the following components:       Result Value    Lactate 2.20 (*)     All other components within normal limits   CBC WITH AUTO DIFFERENTIAL - Abnormal; Notable for the following components:    RBC 4.03 (*)     Hemoglobin 12.3 (*)     Hematocrit 40.6 (*)     .7 (*)     MCHC 30.3 (*)     Seg Neutrophils 84.5 (*)     Neutrophils Absolute 8.8 (*)     Lymphocytes 8.1 (*)     Lymphocytes Absolute 0.8 (*)     All other components within normal limits   COMPREHENSIVE METABOLIC PANEL - Abnormal; Notable for the following components:    Creatinine 1.6 (*)     BUN 41 (*)     CO2 34 (*)     POC CALCIUM 10.9 (*)     Albumin 3.2 (*)     All other components within normal limits   BRAIN NATRIURETIC PEPTIDE - Abnormal; Notable for the following components:    NT Pro-BNP 1299.0 (*)     All other components within normal limits   GLOMERULAR FILTRATION RATE, ESTIMATED - Abnormal; Notable for the following components:    Est, Glom Filt Rate 42 (*)     All other components within normal limits   ANION GAP - Abnormal; Notable for the following components:    Anion Gap 7.0 (*)     All other components within normal limits

## 2025-05-29 NOTE — DISCHARGE INSTRUCTIONS
Medications as prescribed.  Continue your breathing treatments every 3-4 hours as needed for wheezing and shortness of breath.  Continue current oxygen and other measures for respiratory support.  Return to the emergency department for worsening symptoms.  Plan follow-up with your primary care provider or lung specialist.

## 2025-05-31 LAB — BACTERIA BLD AEROBE CULT: NORMAL

## 2025-06-03 LAB — BACTERIA BLD AEROBE CULT: NORMAL
